# Patient Record
Sex: MALE | Race: WHITE | NOT HISPANIC OR LATINO | Employment: OTHER | URBAN - METROPOLITAN AREA
[De-identification: names, ages, dates, MRNs, and addresses within clinical notes are randomized per-mention and may not be internally consistent; named-entity substitution may affect disease eponyms.]

---

## 2021-06-23 ENCOUNTER — TELEPHONE (OUTPATIENT)
Dept: ONCOLOGY | Facility: CLINIC | Age: 61
End: 2021-06-23

## 2021-06-23 NOTE — TELEPHONE ENCOUNTER
Caller: Varsha Harris    Relationship: Self    Best call back number: [phone]     What was the call regarding: VARSHA IS A PATIENT OF DR. MAHER'S FROM BEFORE HE JOINED OUR PRACTICE. HE SAYS HE NEEDS SOME PAPERWORK FILLED OUT FOR HIS LONG TERM DISABILITY. HE SAYS DR. MAHER FILLED IT OUT THE LAST TIME. THEY WILL BE FAXING A FORM OVER [phone] TO BE FILLED OUT.    Do you require a callback: IF NEEDED

## 2021-06-25 NOTE — TELEPHONE ENCOUNTER
Case Management/ Note    Patient Name: Kit Harris  YOB: 1960  MRN #: [id number]    OSW spoke with Kit regarding his LT disability insurance as no fax has been received as of this date. He said he spoke with a representative from [facility] who said they have all they need. He is going to check with them again to make sure of this.     He is asking if his medical records from [facility] have been sent and was told that it did not look like anything has been received. He was assured that we have staff to take care of this, and that  our clinical manager would be apprised of his request.     Electronically signed by:   Shaylee Rader LCSW, OSW-C  06/25/21, 10:05 EDT

## 2021-08-09 ENCOUNTER — APPOINTMENT (OUTPATIENT)
Dept: LAB | Facility: HOSPITAL | Age: 61
End: 2021-08-09

## 2021-08-09 DIAGNOSIS — C20 RECTAL CANCER (HCC): Primary | ICD-10-CM

## 2021-08-23 ENCOUNTER — TELEPHONE (OUTPATIENT)
Dept: ONCOLOGY | Facility: HOSPITAL | Age: 61
End: 2021-08-23

## 2021-08-23 NOTE — TELEPHONE ENCOUNTER
Received call from [facility] needing lab orders for patient.  Faxed orders to [phone] per request

## 2021-08-25 NOTE — PROGRESS NOTES
HEMATOLOGY ONCOLOGY OUTPATIENT CONSULTATION       Patient name: Kit Harris  : 1960  MRN: [id number]  Primary Care Physician: Eliu Richards  Referring Physician: Eliu Richards  Reason For Consult:     Chief Complaint   Patient presents with   • Appointment         HPI:   History of Present Illness:  Kit Harris is 61 y.o. male who presented to our office on 21 for consultation regarding  Rectal adenocarcinoma status post multimodality treatment.    Patient had a heart attack in 2018. Patient had a stent placed and was started on dual antiplatelt therapy with aspirin and Brillinta. Patient has had minimal blood in stools prior to this. Patient was known to have fissure in the past and he always thought that was the cause of his blood in stools. When the patient started on anticoagulant, patient had worsening of his bleeding. He was then referred to see Dr. Bateman and had an EGD/colonsocopy    2019 - EGD/colon- moderate inactive gastritis. Reflux changes. Adenocarcinoma of the rectum. Large polyp right colon.  Biopsy confirmed adenocarcinoma of the rectum. Polypectomy revealed tubular adenoma.  2019 EUS revealed a large partially obstructing mass in the rectum. With clear invasion of muscularis propria. At least one 4 x 6 mm lymph node was appreciated. Presumptive diagnosis of T3 N1 rectal cancer.  MRI confirms diagnosis , PET with possible liver lesion , negative on MRI  Started on neoadjuvant treatment with XELODA and radiation    19 - C1D1 XelodaRT  10/3/19 - completed radiation treatment.  10/30/19 - Repeat MRI with good response based on imaging, no focal rectal mass lesion seen, decreased size of lymph node  19- Low anterior resection by Dr. Rich in [place]. This was complicated by Abscess, WILL post surgery.   Final path with ypT2N0 staging 0/20 LN positive  20 - C1D1 XELOX  20 C2D1  20 - C3D1  With intolerance,  treatment changed to single agent xeloda and eventually stopped   7/2020 - CT imaging with mildly increased adenopathy in the abdomen  11/2020 - CT imaging with improvement in presacral area, no evidence of disease.  5/2021 - CT CAP interval development of wedge compression fracture. No lytic lesions. No evidence of metastatic disease.  6/2021 -patient was admitted to [facility] for reversal of ostomy.  He had complications after the procedure with anastomotic leak requiring emergency surgery and repeat ileostomy.  He had aspiration pneumonia was on the ventilator and eventually recovered.  He is discharged has a wound VAC in place.  July 11, 2021 -CT chest abdomen pelvis with contrast showing no evidence of recurrence of disease.  August 2021 -CEA 2.6.      Subjective:  • 08/06/21.  Patient seen in clinic.  He has wound VAC in place.  He states that his wound is healing well.  He continues to see wound care for the same.  Denies any recent weight loss or any other concerning symptoms.    The following portions of the patient's history were reviewed and updated as appropriate: allergies, current medications, past family history, past medical history, past social history, past surgical history and problem list.    Past Medical History:   Diagnosis Date   • Back pain    • Constipation    • COPD (chronic obstructive pulmonary disease) (CMS/HCC)    • History of heart attack    • HTN (hypertension)    • Hyperlipidemia    • Ileostomy, has currently (CMS/HCC)    • Low serum cortisol level (CMS/HCC)    • ROCIO (obstructive sleep apnea)    • Rectal cancer (CMS/HCC)        Past Surgical History:   Procedure Laterality Date   • BACK SURGERY     • CHOLECYSTECTOMY     • CORONARY ANGIOPLASTY     • HERNIA REPAIR     • ILEOSTOMY           Current Outpatient Medications:   •  Fluticasone-Umeclidin-Vilant (TRELEGY) 200-62.5-25 MCG/INH inhaler, Inhale 1 puff., Disp: , Rfl:   •  Acetaminophen 325 MG capsule, , Disp: , Rfl:   •   albuterol sulfate  (90 Base) MCG/ACT inhaler, Inhale 2 puffs 4 (Four) Times a Day As Needed., Disp: , Rfl:   •  aspirin (aspirin) 81 MG EC tablet, Take 81 mg by mouth., Disp: , Rfl:   •  atorvastatin (LIPITOR) 40 MG tablet, Take 40 mg by mouth Daily., Disp: , Rfl:   •  Calcium Carb-Cholecalciferol (Calcium 1000 + D) 1000-800 MG-UNIT tablet, Take 1,200 mg by mouth Daily., Disp: , Rfl:   •  calcium carbonate-vitamin d 600-400 MG-UNIT per tablet, Take  by mouth., Disp: , Rfl:   •  diclofenac (VOLTAREN) 50 MG EC tablet, Take 50 mg by mouth 2 (Two) Times a Day., Disp: , Rfl:   •  famotidine (PEPCID) 40 MG tablet, Take 40 mg by mouth every night at bedtime., Disp: , Rfl:   •  gabapentin (NEURONTIN) 600 MG tablet, , Disp: , Rfl:   •  oxyCODONE (ROXICODONE) 10 MG tablet, TAKE 1 TABLET BY MOUTH EVERY 4 6 HOURS AS NEEDED FOR PAIN. AVERAGE 6 TABS/24H, Disp: , Rfl:   •  predniSONE (DELTASONE) 5 MG tablet, Take 5 mg by mouth Daily., Disp: , Rfl:   •  Trelegy Ellipta 200-62.5-25 MCG/INH inhaler, INHALE 1 PUFF 1 (ONE) TIME EACH DAY., Disp: , Rfl:     Allergies   Allergen Reactions   • Adhesive Tape Unknown - Low Severity     Other reaction(s): Multiple skin tears       Family History   Problem Relation Age of Onset   • Leukemia Mother    • Heart disease Father    • Lung disease Father    • Vaginal cancer Sister    • Diabetes Brother    • Hypertension Brother        Cancer-related family history includes Vaginal cancer in his sister.    Social History     Tobacco Use   • Smoking status: Former Smoker   • Tobacco comment: Quit 06/2021   Substance Use Topics   • Alcohol use: Not Currently   • Drug use: Never     Social History     Social History Narrative   • Not on file        ROS:     Review of Systems   Constitutional: Positive for fatigue. Negative for fever.   HENT: Negative for congestion and nosebleeds.    Eyes: Negative for pain.   Respiratory: Negative for cough and shortness of breath.    Cardiovascular: Negative for  "chest pain.   Gastrointestinal: Negative for abdominal pain, blood in stool, diarrhea, nausea and vomiting.   Endocrine: Negative for cold intolerance and heat intolerance.   Genitourinary: Negative for difficulty urinating.   Musculoskeletal: Negative for arthralgias.   Skin: Negative for rash.   Neurological: Negative for dizziness and headaches.   Hematological: Does not bruise/bleed easily.   Psychiatric/Behavioral: Negative for behavioral problems.       Objective:    Vitals:    08/26/21 0940   BP: 115/97   Pulse: 97   Resp: 18   Temp: 97.8 °F (36.6 °C)   Weight: 87.2 kg (192 lb 3.2 oz)   Height: 177.8 cm (70\")   PainSc: 0-No pain     Body mass index is 27.58 kg/m².  ECOG  (1) Restricted in physically strenuous activity, ambulatory and able to do work of light nature    Physical Exam:     Physical Exam  Constitutional:       Appearance: Normal appearance.   HENT:      Head: Normocephalic and atraumatic.   Eyes:      Pupils: Pupils are equal, round, and reactive to light.   Cardiovascular:      Rate and Rhythm: Normal rate and regular rhythm.      Pulses: Normal pulses.      Heart sounds: No murmur heard.     Pulmonary:      Effort: Pulmonary effort is normal.      Breath sounds: Normal breath sounds.   Abdominal:      General: There is no distension.      Palpations: Abdomen is soft. There is no mass.      Tenderness: There is no abdominal tenderness.   Musculoskeletal:         General: Normal range of motion.      Cervical back: Normal range of motion.   Skin:     General: Skin is warm.   Neurological:      General: No focal deficit present.      Mental Status: He is alert.   Psychiatric:         Mood and Affect: Mood normal.         Lab Results - Last 18 Months   Lab Units 07/16/21  0351 07/14/21  0251 07/11/21  0401   WBC 10*3/uL 12.73* 19.77* 22.97*   HEMOGLOBIN g/dL 9.7* 10.0* 9.0*   HEMATOCRIT % 30.8* 31.0* 27.2*   PLATELETS 10*3/uL 489* 467* 388   MCV fL 96.3 94.8 93.8     Lab Results - Last 18 Months "   Lab Units 07/09/21  0605 07/08/21  1710 07/08/21  0442   POTASSIUM mmol/L 3.4* 2.3*  --    BILIRUBIN mg/dL  --   --  0.4   ALK PHOS U/L  --   --  190*   ALT (SGPT) U/L  --   --  79*   AST (SGOT) U/L  --   --  71*       Lab Results   Component Value Date    BUN 10 12/05/2019    CREATININE 1.2 12/05/2019    BCR 8.3 12/05/2019    K 3.4 (L) 07/09/2021    CO2 27 12/05/2019    CALCIUM 8.4 12/05/2019    ALBUMIN 2.7 (L) 07/08/2021    LABIL2 0.9 (L) 07/08/2021    AST 71 (H) 07/08/2021    ALT 79 (H) 07/08/2021       Lab Results - Last 18 Months   Lab Units 07/03/21  1841   INR INR 1.4   APTT s 34.5       No results found for: IRON, TIBC, FERRITIN    No results found for: FOLATE    No results found for: OCCULTBLD    No results found for: RETICCTPCT    No results found for: CDOMRXTH46  No results found for: SPEP, UPEP  Uric Acid   Date Value Ref Range Status   11/25/2019 7.1 2.5 - 8.5 mg/dL Final     No results found for: TAN, RF, SEDRATE  Lab Results   Component Value Date    FIBRINOGEN 576 (H) 07/03/2021     Lab Results   Component Value Date    PTT 34.5 07/03/2021    INR 1.4 07/03/2021     No results found for:   No results found for: CEA  No components found for: CA-19-9  No results found for: PSA  No results found for: AFPTM, FC2800, HCGTM, SPEP, LARISSA         Assessment/Plan     Patient is a 61-year-old male with T3 N1 M0 rectal adenocarcinoma. He has completed treatment with neoadjuvant chemoradiation with Xeloda s/p LAR and now on adjuvant chemotherapy with Xeloda.    Rectal adenocarcinoma  T3N1 disease preoperatively  downstaged to ypT2N0 post surgery with no lymph nodes positive  I discussed adjuvant chemotherapy for a total of 4 months with XELOX based on NCCN guidelines, significant benefit even with downstaged after neoadjuvant chemoradiation. Benefit is not very significant in patients with CR however patient had a NM and would still have benefit.   Patient was agreeable and was started on XELOX   C2 with  worsening neuropathty affecting quality of life, hand foot disease with scaling, erythema of the hands  Dose reduction of Oxaliplatin to 75 mg/m2 and Xeloda to 1500 mg BID  C3 with worsening neuropathy, Oxaliplatin was stopped and continued single agent Xeloda. Patient has had significant toxicity with xeloda, most concerning is neurotoxicity with confusion which has improved significantly after stopping Xeloda.  With increasing toxicity, treatment was witheld. DPD enzyme assay was normal.  Patient was seen by his surgeon for ileostomy reversal.contineus to have a presacral collection and had a drain tube placed with recent imaging showing improvement in the area.    Ileostomy reversal complicated with postop anastomotic leak, aspiration pneumonia.  He has a new ileostomy.  Continue see wound care wound has been improving.  July 2021 imaging with no evidence of disease recent CEA 2.6 and normal.  Follow up in 3 months with cbc cmp cea, imaging.    Central Hypothyroidism  Patient is on low dose prednisone 5 mg.    Leg weakness/Balance problems  Neuropathy is likely contributing to the same  gabapentin 600 mg QID   that has improved now.    Lower back pain  - patient has seen a pain clinic, continues to be on oxycodone 10 mg as needed, also has had steroid injection , pain control better. Has had compression fracture,  bisphosphonates with Endocrine continues to be on calcium vitamin D.. Follow up with pain clinic and orthopedics.    COPD   echocardiogram with normal findings  likely from COPD  ICS stopped, continues to be on Stiolto   Follows Dr. Sheffield at [facility].    Anemia  S/p 2 doses of iv venofer  Hb slightly low now which is postop.  Continues to take oral iron.  I would repeat this on follow-up.    Follow-up in 3 months for surveillance         Thank you very much for providing the opportunity to participate in this patient’s care. Please do not hesitate to call if there are any other questions    I have reviewed  and confirmed the accuracy of the patient's history: Chief complaint, HPI, ROS, Subjective and Past Family Social History as entered by the MA/LPN/RN.     Patricia Nguyễn MD 08/26/21

## 2021-08-26 ENCOUNTER — OFFICE VISIT (OUTPATIENT)
Dept: ONCOLOGY | Facility: CLINIC | Age: 61
End: 2021-08-26

## 2021-08-26 VITALS
BODY MASS INDEX: 27.52 KG/M2 | TEMPERATURE: 97.8 F | SYSTOLIC BLOOD PRESSURE: 115 MMHG | WEIGHT: 192.2 LBS | RESPIRATION RATE: 18 BRPM | HEART RATE: 97 BPM | DIASTOLIC BLOOD PRESSURE: 97 MMHG | HEIGHT: 70 IN

## 2021-08-26 DIAGNOSIS — C20 RECTAL CANCER (HCC): Primary | ICD-10-CM

## 2021-08-26 PROCEDURE — 99204 OFFICE O/P NEW MOD 45 MIN: CPT | Performed by: INTERNAL MEDICINE

## 2021-08-26 RX ORDER — ATORVASTATIN CALCIUM 40 MG/1
40 TABLET, FILM COATED ORAL DAILY
COMMUNITY
Start: 2021-07-12 | End: 2022-12-22 | Stop reason: DRUGHIGH

## 2021-08-26 RX ORDER — PREDNISONE 1 MG/1
5 TABLET ORAL DAILY
COMMUNITY
Start: 2021-07-27

## 2021-08-26 RX ORDER — OXYCODONE HYDROCHLORIDE 10 MG/1
15 TABLET ORAL
COMMUNITY
Start: 2021-07-10

## 2021-08-26 RX ORDER — FAMOTIDINE 40 MG/1
40 TABLET, FILM COATED ORAL
COMMUNITY
Start: 2021-07-26

## 2021-08-26 RX ORDER — ALBUTEROL SULFATE 90 UG/1
2 AEROSOL, METERED RESPIRATORY (INHALATION) 4 TIMES DAILY PRN
COMMUNITY
Start: 2021-07-26

## 2021-08-26 RX ORDER — GABAPENTIN 600 MG/1
TABLET ORAL
COMMUNITY
Start: 2021-08-05

## 2021-08-26 RX ORDER — ASPIRIN 81 MG/1
81 TABLET ORAL
COMMUNITY

## 2021-11-11 ENCOUNTER — TELEPHONE (OUTPATIENT)
Dept: ONCOLOGY | Facility: CLINIC | Age: 61
End: 2021-11-11

## 2021-11-11 NOTE — TELEPHONE ENCOUNTER
Hub is instructed to read the documentation below to patient  ATTEMPTED TO CONTACT PATIENT REGARDING 12-02-21 MD F/U.    NO ANSWER.  LMV INFORMING HIM DR. MAHER WAS OUT OF THE OFFICE THAT DAY AND HE WOULD NEED TO RESCHEDULE.

## 2021-11-29 ENCOUNTER — LAB (OUTPATIENT)
Dept: LAB | Facility: HOSPITAL | Age: 61
End: 2021-11-29

## 2021-11-29 ENCOUNTER — HOSPITAL ENCOUNTER (OUTPATIENT)
Dept: ONCOLOGY | Facility: HOSPITAL | Age: 61
Setting detail: INFUSION SERIES
Discharge: HOME | End: 2021-11-29

## 2021-11-29 ENCOUNTER — HOSPITAL ENCOUNTER (OUTPATIENT)
Dept: PET IMAGING | Facility: HOSPITAL | Age: 61
Discharge: HOME | End: 2021-11-29
Admitting: INTERNAL MEDICINE

## 2021-11-29 DIAGNOSIS — C20 RECTAL CANCER (HCC): ICD-10-CM

## 2021-11-29 DIAGNOSIS — C20 RECTAL CANCER (HCC): Primary | ICD-10-CM

## 2021-11-29 LAB
ALBUMIN SERPL-MCNC: 4.1 G/DL (ref 3.5–5.2)
ALBUMIN/GLOB SERPL: 1.4 G/DL
ALP SERPL-CCNC: 119 U/L (ref 39–117)
ALT SERPL W P-5'-P-CCNC: 40 U/L (ref 1–41)
ANION GAP SERPL CALCULATED.3IONS-SCNC: 13 MMOL/L (ref 5–15)
AST SERPL-CCNC: 31 U/L (ref 1–40)
BASOPHILS # BLD AUTO: 0.02 10*3/MM3 (ref 0–0.2)
BASOPHILS NFR BLD AUTO: 0.3 % (ref 0–1.5)
BILIRUB SERPL-MCNC: 0.3 MG/DL (ref 0–1.2)
BUN SERPL-MCNC: 24 MG/DL (ref 8–23)
BUN/CREAT SERPL: 22.2 (ref 7–25)
CALCIUM SPEC-SCNC: 9.3 MG/DL (ref 8.6–10.5)
CEA SERPL-MCNC: 1.96 NG/ML
CHLORIDE SERPL-SCNC: 95 MMOL/L (ref 98–107)
CO2 SERPL-SCNC: 26 MMOL/L (ref 22–29)
CREAT BLDA-MCNC: 1.2 MG/DL (ref 0.6–1.3)
CREAT SERPL-MCNC: 1.08 MG/DL (ref 0.76–1.27)
DEPRECATED RDW RBC AUTO: 50.3 FL (ref 37–54)
EOSINOPHIL # BLD AUTO: 0.12 10*3/MM3 (ref 0–0.4)
EOSINOPHIL NFR BLD AUTO: 1.5 % (ref 0.3–6.2)
ERYTHROCYTE [DISTWIDTH] IN BLOOD BY AUTOMATED COUNT: 14.9 % (ref 12.3–15.4)
GFR SERPL CREATININE-BSD FRML MDRD: 70 ML/MIN/1.73
GLOBULIN UR ELPH-MCNC: 2.9 GM/DL
GLUCOSE SERPL-MCNC: 88 MG/DL (ref 65–99)
HCT VFR BLD AUTO: 36.2 % (ref 37.5–51)
HGB BLD-MCNC: 11.6 G/DL (ref 13–17.7)
LYMPHOCYTES # BLD AUTO: 0.94 10*3/MM3 (ref 0.7–3.1)
LYMPHOCYTES NFR BLD AUTO: 12 % (ref 19.6–45.3)
MCH RBC QN AUTO: 30.2 PG (ref 26.6–33)
MCHC RBC AUTO-ENTMCNC: 32 G/DL (ref 31.5–35.7)
MCV RBC AUTO: 94.3 FL (ref 79–97)
MONOCYTES # BLD AUTO: 0.66 10*3/MM3 (ref 0.1–0.9)
MONOCYTES NFR BLD AUTO: 8.4 % (ref 5–12)
NEUTROPHILS NFR BLD AUTO: 6.08 10*3/MM3 (ref 1.7–7)
NEUTROPHILS NFR BLD AUTO: 77.8 % (ref 42.7–76)
PLATELET # BLD AUTO: 270 10*3/MM3 (ref 140–450)
PMV BLD AUTO: 9 FL (ref 6–12)
POTASSIUM SERPL-SCNC: 4.6 MMOL/L (ref 3.5–5.2)
PROT SERPL-MCNC: 7 G/DL (ref 6–8.5)
RBC # BLD AUTO: 3.84 10*6/MM3 (ref 4.14–5.8)
SODIUM SERPL-SCNC: 134 MMOL/L (ref 136–145)
WBC NRBC COR # BLD: 7.82 10*3/MM3 (ref 3.4–10.8)

## 2021-11-29 PROCEDURE — 85025 COMPLETE CBC W/AUTO DIFF WBC: CPT

## 2021-11-29 PROCEDURE — 82565 ASSAY OF CREATININE: CPT

## 2021-11-29 PROCEDURE — 71260 CT THORAX DX C+: CPT

## 2021-11-29 PROCEDURE — 0 IOPAMIDOL PER 1 ML: Performed by: INTERNAL MEDICINE

## 2021-11-29 PROCEDURE — 82378 CARCINOEMBRYONIC ANTIGEN: CPT

## 2021-11-29 PROCEDURE — 80053 COMPREHEN METABOLIC PANEL: CPT

## 2021-11-29 PROCEDURE — 74177 CT ABD & PELVIS W/CONTRAST: CPT

## 2021-11-29 RX ADMIN — IOPAMIDOL 100 ML: 755 INJECTION, SOLUTION INTRAVENOUS at 10:40

## 2021-11-29 NOTE — PROGRESS NOTES
Patient came from [facility], dept with #22 angiocath saline locked intact to right AC space and unable to obtain enough blood from this angicath to do labs as ordered per MD. Angiocath flushed and removed intact. Applied sterile gauze and band-aid [facility] lab staff wendy labs as ordered per MD.

## 2021-12-02 ENCOUNTER — APPOINTMENT (OUTPATIENT)
Dept: ONCOLOGY | Facility: CLINIC | Age: 61
End: 2021-12-02

## 2021-12-06 ENCOUNTER — TELEPHONE (OUTPATIENT)
Dept: ONCOLOGY | Facility: CLINIC | Age: 61
End: 2021-12-06

## 2021-12-06 NOTE — TELEPHONE ENCOUNTER
Caller: MASSIEL BOLTON    Relationship: Emergency Contact    Best call back number: [phone]    What is the best time to reach you: ANYTIME    Who are you requesting to speak with (clinical staff, provider,  specific staff member): CLINICAL      What was the call regarding: PATIENTS SPOUSE CALLED WANTED TO GET CT SCAN RESULTS    Do you require a callback: YES

## 2021-12-14 NOTE — PROGRESS NOTES
HEMATOLOGY ONCOLOGY OUTPATIENT FOLLOW UP      Patient name: Kit Harris  : 1960  MRN: [id number]  Primary Care Physician: Eliu Richards  Referring Physician: Eliu Richards  Reason For Consult:     Chief Complaint   Patient presents with   • Follow-up     Rectal cancer         HPI:   History of Present Illness:  Kit Harris is 61 y.o. male who presented to our office on 21 for consultation regarding  Rectal adenocarcinoma status post multimodality treatment.    Patient had a heart attack in 2018. Patient had a stent placed and was started on dual antiplatelt therapy with aspirin and Brillinta. Patient has had minimal blood in stools prior to this. Patient was known to have fissure in the past and he always thought that was the cause of his blood in stools. When the patient started on anticoagulant, patient had worsening of his bleeding. He was then referred to see Dr. Bateman and had an EGD/colonsocopy    2019 - EGD/colon- moderate inactive gastritis. Reflux changes. Adenocarcinoma of the rectum. Large polyp right colon.  Biopsy confirmed adenocarcinoma of the rectum. Polypectomy revealed tubular adenoma.  2019 EUS revealed a large partially obstructing mass in the rectum. With clear invasion of muscularis propria. At least one 4 x 6 mm lymph node was appreciated. Presumptive diagnosis of T3 N1 rectal cancer.  MRI confirms diagnosis , PET with possible liver lesion , negative on MRI  Started on neoadjuvant treatment with XELODA and radiation    19 - C1D1 XelodaRT  10/3/19 - completed radiation treatment.  10/30/19 - Repeat MRI with good response based on imaging, no focal rectal mass lesion seen, decreased size of lymph node  19- Low anterior resection by Dr. Rich in [place]. This was complicated by Abscess, WILL post surgery.   Final path with ypT2N0 staging 0/20 LN positive  20 - C1D1 XELOX  20 C2D1  20 - C3D1  With  intolerance, treatment changed to single agent xeloda and eventually stopped   7/2020 - CT imaging with mildly increased adenopathy in the abdomen  11/2020 - CT imaging with improvement in presacral area, no evidence of disease.  5/2021 - CT CAP interval development of wedge compression fracture. No lytic lesions. No evidence of metastatic disease.  6/2021 -patient was admitted to [facility] for reversal of ostomy.  He had complications after the procedure with anastomotic leak requiring emergency surgery and repeat ileostomy.  He had aspiration pneumonia was on the ventilator and eventually recovered.  He is discharged has a wound VAC in place.  July 11, 2021 -CT chest abdomen pelvis with contrast showing no evidence of recurrence of disease.  August 2021 -CEA 2.6.  November 2021 -CT chest abdomen pelvis with stable postoperative changes, stable compression fractures, stable fracture of the sacrum.  No evidence of new or recurrent disease.  December 2021 -CEA 1.9.    Subjective:  Patient is seen for follow-up.  Overall doing well has some intermittent rectal bleeding.  Fatigue is improved since working with physical therapy    The following portions of the patient's history were reviewed and updated as appropriate: allergies, current medications, past family history, past medical history, past social history, past surgical history and problem list.    Past Medical History:   Diagnosis Date   • Back pain    • Constipation    • COPD (chronic obstructive pulmonary disease) (HCC)    • History of heart attack    • HTN (hypertension)    • Hyperlipidemia    • Ileostomy, has currently (HCC)    • Low serum cortisol level (HCC)    • ROCIO (obstructive sleep apnea)    • Rectal cancer (HCC)        Past Surgical History:   Procedure Laterality Date   • BACK SURGERY     • CHOLECYSTECTOMY     • CORONARY ANGIOPLASTY     • HERNIA REPAIR     • ILEOSTOMY           Current Outpatient Medications:   •  Acetaminophen 325 MG capsule,  , Disp: , Rfl:   •  albuterol sulfate  (90 Base) MCG/ACT inhaler, Inhale 2 puffs 4 (Four) Times a Day As Needed., Disp: , Rfl:   •  aspirin (aspirin) 81 MG EC tablet, Take 81 mg by mouth., Disp: , Rfl:   •  atorvastatin (LIPITOR) 40 MG tablet, Take 40 mg by mouth Daily., Disp: , Rfl:   •  Calcium Carb-Cholecalciferol (Calcium 1000 + D) 1000-800 MG-UNIT tablet, Take 1,200 mg by mouth Daily., Disp: , Rfl:   •  calcium carbonate-vitamin d 600-400 MG-UNIT per tablet, Take  by mouth., Disp: , Rfl:   •  diclofenac (VOLTAREN) 50 MG EC tablet, Take 50 mg by mouth 2 (Two) Times a Day., Disp: , Rfl:   •  famotidine (PEPCID) 40 MG tablet, Take 40 mg by mouth every night at bedtime., Disp: , Rfl:   •  Fluticasone-Umeclidin-Vilant (TRELEGY) 200-62.5-25 MCG/INH inhaler, Inhale 1 puff., Disp: , Rfl:   •  gabapentin (NEURONTIN) 600 MG tablet, , Disp: , Rfl:   •  oxyCODONE (ROXICODONE) 10 MG tablet, , Disp: , Rfl:   •  predniSONE (DELTASONE) 5 MG tablet, Take 5 mg by mouth Daily., Disp: , Rfl:   •  Trelegy Ellipta 200-62.5-25 MCG/INH inhaler, INHALE 1 PUFF 1 (ONE) TIME EACH DAY., Disp: , Rfl:     Allergies   Allergen Reactions   • Adhesive Tape Unknown - Low Severity     Other reaction(s): Multiple skin tears       Family History   Problem Relation Age of Onset   • Leukemia Mother    • Heart disease Father    • Lung disease Father    • Vaginal cancer Sister    • Diabetes Brother    • Hypertension Brother        Cancer-related family history includes Vaginal cancer in his sister.    Social History     Tobacco Use   • Smoking status: Former Smoker   • Smokeless tobacco: Not on file   • Tobacco comment: Quit 06/2021   Substance Use Topics   • Alcohol use: Not Currently   • Drug use: Never     Social History     Social History Narrative   • Not on file        ROS:     Review of Systems   Constitutional: Positive for fatigue. Negative for fever.   HENT: Negative for congestion and nosebleeds.    Eyes: Negative for pain.  "  Respiratory: Negative for cough and shortness of breath.    Cardiovascular: Negative for chest pain.   Gastrointestinal: Positive for anal bleeding. Negative for abdominal pain, blood in stool, diarrhea, nausea and vomiting.   Endocrine: Negative for cold intolerance and heat intolerance.   Genitourinary: Negative for difficulty urinating.   Musculoskeletal: Negative for arthralgias.   Skin: Negative for rash.   Neurological: Negative for dizziness and headaches.   Hematological: Does not bruise/bleed easily.   Psychiatric/Behavioral: Negative for behavioral problems.       Objective:    Vitals:    12/16/21 0954   BP: 92/64   Pulse: 92   Resp: 18   Temp: 97.8 °F (36.6 °C)   SpO2: 95%   Weight: 90.4 kg (199 lb 6.4 oz)   Height: 177.8 cm (70\")   PainSc:   6   PainLoc: Back     Body mass index is 28.61 kg/m².  ECOG  (1) Restricted in physically strenuous activity, ambulatory and able to do work of light nature    Physical Exam:     Physical Exam  Constitutional:       Appearance: Normal appearance.   HENT:      Head: Normocephalic and atraumatic.   Eyes:      Pupils: Pupils are equal, round, and reactive to light.   Cardiovascular:      Rate and Rhythm: Normal rate and regular rhythm.      Pulses: Normal pulses.      Heart sounds: No murmur heard.      Pulmonary:      Effort: Pulmonary effort is normal.      Breath sounds: Normal breath sounds.   Abdominal:      General: There is no distension.      Palpations: Abdomen is soft. There is no mass.      Tenderness: There is no abdominal tenderness.   Musculoskeletal:         General: Normal range of motion.      Cervical back: Normal range of motion.   Skin:     General: Skin is warm.   Neurological:      General: No focal deficit present.      Mental Status: He is alert.   Psychiatric:         Mood and Affect: Mood normal.         Lab Results - Last 18 Months   Lab Units 11/29/21  1115 07/16/21  0351 07/14/21  0251   WBC 10*3/mm3 7.82 12.73* 19.77*   HEMOGLOBIN g/dL " 11.6* 9.7* 10.0*   HEMATOCRIT % 36.2* 30.8* 31.0*   PLATELETS 10*3/mm3 270 489* 467*   MCV fL 94.3 96.3 94.8     Lab Results - Last 18 Months   Lab Units 11/29/21  1115 11/29/21  1018 07/09/21  0605 07/08/21  1710 07/08/21  0442   SODIUM mmol/L 134*  --   --   --   --    POTASSIUM mmol/L 4.6  --  3.4* 2.3*  --    CHLORIDE mmol/L 95*  --   --   --   --    CO2 mmol/L 26.0  --   --   --   --    BUN mg/dL 24*  --   --   --   --    CREATININE mg/dL 1.08 1.20  --   --   --    CALCIUM mg/dL 9.3  --   --   --   --    BILIRUBIN mg/dL 0.3  --   --   --  0.4   ALK PHOS U/L 119*  --   --   --  190*   ALT (SGPT) U/L 40  --   --   --  79*   AST (SGOT) U/L 31  --   --   --  71*   GLUCOSE mg/dL 88  --   --   --   --        Lab Results   Component Value Date    GLUCOSE 88 11/29/2021    BUN 24 (H) 11/29/2021    CREATININE 1.08 11/29/2021    EGFRIFNONA 70 11/29/2021    BCR 22.2 11/29/2021    K 4.6 11/29/2021    CO2 26.0 11/29/2021    CALCIUM 9.3 11/29/2021    ALBUMIN 4.10 11/29/2021    LABIL2 0.9 (L) 07/08/2021    AST 31 11/29/2021    ALT 40 11/29/2021       Lab Results - Last 18 Months   Lab Units 07/03/21  1841   INR INR 1.4   APTT s 34.5       No results found for: IRON, TIBC, FERRITIN    No results found for: FOLATE    No results found for: OCCULTBLD    No results found for: RETICCTPCT    No results found for: UJEINAKT34  No results found for: SPEP, UPEP  Uric Acid   Date Value Ref Range Status   11/25/2019 7.1 2.5 - 8.5 mg/dL Final     No results found for: TAN, RF, SEDRATE  Lab Results   Component Value Date    FIBRINOGEN 576 (H) 07/03/2021     Lab Results   Component Value Date    PTT 34.5 07/03/2021    INR 1.4 07/03/2021     No results found for:   Lab Results   Component Value Date    CEA 1.96 11/29/2021     No components found for: CA-19-9  No results found for: PSA  No results found for: AFPTM, TZ4222, HCGTM, SPEP, LARISSA         Assessment/Plan     Patient is a 61-year-old male with T3 N1 M0 rectal adenocarcinoma. He has  completed treatment with neoadjuvant chemoradiation with Xeloda s/p LAR and finished adjuvant treatment with XELOX    Rectal adenocarcinoma  T3N1 disease preoperatively  downstaged to ypT2N0 post surgery with no lymph nodes positive  I discussed adjuvant chemotherapy for a total of 4 months with XELOX based on NCCN guidelines, significant benefit even with downstaged after neoadjuvant chemoradiation. Benefit is not very significant in patients with CR however patient had a AK and would still have benefit.   Patient was agreeable and was started on XELOX   C2 with worsening neuropathty affecting quality of life, hand foot disease with scaling, erythema of the hands  Dose reduction of Oxaliplatin to 75 mg/m2 and Xeloda to 1500 mg BID  C3 with worsening neuropathy, Oxaliplatin was stopped and continued single agent Xeloda. Patient has had significant toxicity with xeloda, most concerning is neurotoxicity with confusion which has improved significantly after stopping Xeloda.  With increasing toxicity, treatment was witheld. DPD enzyme assay was normal.  Patient was seen by his surgeon for ileostomy reversal.contineus to have a presacral collection and had a drain tube placed with recent imaging showing improvement in the area.  Ileostomy reversal complicated with postop anastomotic leak, aspiration pneumonia.  He has a new ileostomy.  Continue see wound care wound has been improving.  July 2021 imaging with no evidence of disease recent CEA 2.6 and normal.  November imaging with no evidence of disease recurrence.  CEA 1.9.  At this point he is 2 years out  We will switch to 6 months follow-up with CMP CBC CEA and imaging.    Central Hypothyroidism  Patient is on low dose prednisone 5 mg.    Leg weakness/Balance problems  Neuropathy is likely contributing to the same  gabapentin 600 mg QID  that has improved now.    Lower back pain  patient has seen a pain clinic, continues to be on oxycodone 10 mg as needed, also has  had steroid injection , pain control better. Has had compression fracture,  bisphosphonates with Endocrine continues to be on calcium vitamin D.   Patient wants referral to pain clinic locally.    COPD   echocardiogram with normal findings  likely from COPD  ICS stopped, continues to be on Stiolto   Follows Dr. Sheffield at [facility].    Anemia  S/p 2 doses of iv venofer in summer 2021  Now he has intermittent intermittent rectal bleeding and low hemoglobin  I will check iron studies today.  Increase oral iron to once a day from every other day.  Repeat CBC and iron studies in 3 months.      Follow-up in 6 months for surveillance         Thank you very much for providing the opportunity to participate in this patient’s care. Please do not hesitate to call if there are any other questions    I have reviewed and confirmed the accuracy of the patient's history: Chief complaint, HPI, ROS, Subjective and Past Family Social History as entered by the MA/LUISITON/RN.     Patricia Nguyễn MD 12/16/21

## 2021-12-16 ENCOUNTER — OFFICE VISIT (OUTPATIENT)
Dept: ONCOLOGY | Facility: CLINIC | Age: 61
End: 2021-12-16

## 2021-12-16 ENCOUNTER — CLINICAL SUPPORT (OUTPATIENT)
Dept: FAMILY MEDICINE CLINIC | Facility: CLINIC | Age: 61
End: 2021-12-16

## 2021-12-16 VITALS
BODY MASS INDEX: 28.55 KG/M2 | DIASTOLIC BLOOD PRESSURE: 64 MMHG | OXYGEN SATURATION: 95 % | WEIGHT: 199.4 LBS | HEIGHT: 70 IN | RESPIRATION RATE: 18 BRPM | TEMPERATURE: 97.8 F | HEART RATE: 92 BPM | SYSTOLIC BLOOD PRESSURE: 92 MMHG

## 2021-12-16 DIAGNOSIS — M47.27 OSTEOARTHRITIS OF SPINE WITH RADICULOPATHY, LUMBOSACRAL REGION: ICD-10-CM

## 2021-12-16 DIAGNOSIS — C20 RECTAL CANCER (HCC): ICD-10-CM

## 2021-12-16 DIAGNOSIS — M54.17 LUMBOSACRAL RADICULOPATHY: ICD-10-CM

## 2021-12-16 DIAGNOSIS — C20 RECTAL CANCER (HCC): Primary | ICD-10-CM

## 2021-12-16 LAB
ALBUMIN SERPL-MCNC: 4.1 G/DL (ref 3.5–5.2)
ALBUMIN/GLOB SERPL: 1.5 G/DL
ALP SERPL-CCNC: 120 U/L (ref 39–117)
ALT SERPL W P-5'-P-CCNC: 19 U/L (ref 1–41)
ANION GAP SERPL CALCULATED.3IONS-SCNC: 11 MMOL/L (ref 5–15)
AST SERPL-CCNC: 15 U/L (ref 1–40)
BASOPHILS # BLD AUTO: 0.1 10*3/MM3 (ref 0–0.2)
BASOPHILS NFR BLD AUTO: 0.9 % (ref 0–1.5)
BILIRUB SERPL-MCNC: 0.3 MG/DL (ref 0–1.2)
BUN SERPL-MCNC: 10 MG/DL (ref 8–23)
BUN/CREAT SERPL: 9.6 (ref 7–25)
CALCIUM SPEC-SCNC: 9.3 MG/DL (ref 8.6–10.5)
CEA SERPL-MCNC: 2.82 NG/ML
CHLORIDE SERPL-SCNC: 101 MMOL/L (ref 98–107)
CO2 SERPL-SCNC: 28 MMOL/L (ref 22–29)
CREAT SERPL-MCNC: 1.04 MG/DL (ref 0.76–1.27)
DEPRECATED RDW RBC AUTO: 51.6 FL (ref 37–54)
EOSINOPHIL # BLD AUTO: 0.3 10*3/MM3 (ref 0–0.4)
EOSINOPHIL NFR BLD AUTO: 4.8 % (ref 0.3–6.2)
ERYTHROCYTE [DISTWIDTH] IN BLOOD BY AUTOMATED COUNT: 16.2 % (ref 12.3–15.4)
FERRITIN SERPL-MCNC: 99.76 NG/ML (ref 30–400)
GFR SERPL CREATININE-BSD FRML MDRD: 73 ML/MIN/1.73
GLOBULIN UR ELPH-MCNC: 2.7 GM/DL
GLUCOSE SERPL-MCNC: 91 MG/DL (ref 65–99)
HCT VFR BLD AUTO: 34.4 % (ref 37.5–51)
HGB BLD-MCNC: 11.6 G/DL (ref 13–17.7)
IRON 24H UR-MRATE: 43 MCG/DL (ref 59–158)
IRON SATN MFR SERPL: 13 % (ref 20–50)
LYMPHOCYTES # BLD AUTO: 0.7 10*3/MM3 (ref 0.7–3.1)
LYMPHOCYTES NFR BLD AUTO: 9.4 % (ref 19.6–45.3)
MCH RBC QN AUTO: 30.6 PG (ref 26.6–33)
MCHC RBC AUTO-ENTMCNC: 33.8 G/DL (ref 31.5–35.7)
MCV RBC AUTO: 90.6 FL (ref 79–97)
MONOCYTES # BLD AUTO: 0.5 10*3/MM3 (ref 0.1–0.9)
MONOCYTES NFR BLD AUTO: 7.4 % (ref 5–12)
NEUTROPHILS NFR BLD AUTO: 5.6 10*3/MM3 (ref 1.7–7)
NEUTROPHILS NFR BLD AUTO: 77.5 % (ref 42.7–76)
NRBC BLD AUTO-RTO: 0 /100 WBC (ref 0–0.2)
PLATELET # BLD AUTO: 294 10*3/MM3 (ref 140–450)
PMV BLD AUTO: 7.9 FL (ref 6–12)
POTASSIUM SERPL-SCNC: 4 MMOL/L (ref 3.5–5.2)
PROT SERPL-MCNC: 6.8 G/DL (ref 6–8.5)
RBC # BLD AUTO: 3.8 10*6/MM3 (ref 4.14–5.8)
SODIUM SERPL-SCNC: 140 MMOL/L (ref 136–145)
TIBC SERPL-MCNC: 332 MCG/DL (ref 298–536)
TRANSFERRIN SERPL-MCNC: 223 MG/DL (ref 200–360)
WBC NRBC COR # BLD: 7.2 10*3/MM3 (ref 3.4–10.8)

## 2021-12-16 PROCEDURE — 85025 COMPLETE CBC W/AUTO DIFF WBC: CPT | Performed by: INTERNAL MEDICINE

## 2021-12-16 PROCEDURE — 82728 ASSAY OF FERRITIN: CPT | Performed by: INTERNAL MEDICINE

## 2021-12-16 PROCEDURE — 36415 COLL VENOUS BLD VENIPUNCTURE: CPT | Performed by: INTERNAL MEDICINE

## 2021-12-16 PROCEDURE — 84466 ASSAY OF TRANSFERRIN: CPT | Performed by: INTERNAL MEDICINE

## 2021-12-16 PROCEDURE — 80053 COMPREHEN METABOLIC PANEL: CPT | Performed by: INTERNAL MEDICINE

## 2021-12-16 PROCEDURE — 99214 OFFICE O/P EST MOD 30 MIN: CPT | Performed by: INTERNAL MEDICINE

## 2021-12-16 PROCEDURE — 83540 ASSAY OF IRON: CPT | Performed by: INTERNAL MEDICINE

## 2021-12-16 PROCEDURE — 82378 CARCINOEMBRYONIC ANTIGEN: CPT | Performed by: INTERNAL MEDICINE

## 2021-12-16 NOTE — PROGRESS NOTES
Venipuncture Blood Specimen Collection  Venipuncture performed in [facility] by David Us CMA with good hemostasis. Patient tolerated the procedure well without complications.   12/16/21   David Us CMA

## 2021-12-20 PROBLEM — C20 RECTAL CANCER: Status: ACTIVE | Noted: 2019-08-08

## 2022-01-24 NOTE — PROGRESS NOTES
CHIEF COMPLAINT  Lower back pain      Subjective   Kit Harris is a 61 y.o. male who was referred by Patricia Quiñonez MD  to our pain management clinic for consultation, evaluation and treatment of lower back pain. He has been seeing Dr.Gregory Sadler ([place], [place]) currently and changing care due to distance. He has seen spine surgeon who recommended kypho for his new compression fractures found in 12/2021. Last MRI was 12/2021 at [place], [place]. His back pain started in 1990s and has been getting worse since then. He had surgery in 1990 which made the pain better but has been getting worse in the last 10 to 15 years.    Lower back pain is 4/10 on VAS, at maximum is 5/10. Pain is stabbing, shooting, aching in nature. Pain is referred left posterior thigh, posterior calf, toes. Also, has b/l numbness and tingling in b/l foot. The pain is constant. The pain is improved by pain meds. The pain is worse with walking, stnading. States that his left leg gives away.    PHQ-9- 3  SOAPP- 2    PMH:   Rectal adenocarcinoma s/p multimodality treatment and ileostomy-2019, MI-8/20/2018 s/p stent and DAPT, constipation, hypertension, ROCIO, history of back surgery, neurotoxicity with Xeloda in past, ileostomy reversal complicated with postop anastomotic leak, history of compression fracture and on bisphosphonate, calcium and vitamin D, reclast with endocrine.  He has seen pain management before and had steroid injection with 2-3 weeks of relief.    Current Medications:   Roxicodone 10 mg q4H PRN (180 tabs/ month- 90 MME - 1/7/2022)  Mobic 7.5 mg BID PRN  Gabapentin 600 mg QID  Prednisone 5 mg  aspirin      Past Medications:    Past Modalities:  TENS:       no          Physical Therapy Within The Last 6 Months     Yes- 12/2022   Psychotherapy     no  Massage Therapy      no    Patient Complains Of:  Uro-Fecal Incontinence no  Weight Gain/Loss  no  Fever/Chills   no  Weakness   Yes (left leg weakness)      PEG  Assessment   What number best describes your pain on average in the past week?5  What number best describes how, during the past week, pain has interfered with your enjoyment of life?5  What number best describes how, during the past week, pain has interfered with your general activity?  5        Current Outpatient Medications:   •  Acetaminophen 325 MG capsule, , Disp: , Rfl:   •  albuterol sulfate  (90 Base) MCG/ACT inhaler, Inhale 2 puffs 4 (Four) Times a Day As Needed., Disp: , Rfl:   •  aspirin (aspirin) 81 MG EC tablet, Take 81 mg by mouth., Disp: , Rfl:   •  atorvastatin (LIPITOR) 40 MG tablet, Take 40 mg by mouth Daily., Disp: , Rfl:   •  Calcium Carb-Cholecalciferol (Calcium 1000 + D) 1000-800 MG-UNIT tablet, Take 1,200 mg by mouth Daily., Disp: , Rfl:   •  calcium carbonate-vitamin d 600-400 MG-UNIT per tablet, Take  by mouth., Disp: , Rfl:   •  DULoxetine (CYMBALTA) 60 MG capsule, , Disp: , Rfl:   •  famotidine (PEPCID) 40 MG tablet, Take 40 mg by mouth every night at bedtime., Disp: , Rfl:   •  Fluticasone-Umeclidin-Vilant (TRELEGY) 200-62.5-25 MCG/INH inhaler, Inhale 1 puff., Disp: , Rfl:   •  gabapentin (NEURONTIN) 600 MG tablet, , Disp: , Rfl:   •  levothyroxine (SYNTHROID, LEVOTHROID) 50 MCG tablet, , Disp: , Rfl:   •  meloxicam (MOBIC) 7.5 MG tablet, , Disp: , Rfl:   •  metoprolol succinate XL (TOPROL-XL) 25 MG 24 hr tablet, , Disp: , Rfl:   •  oxyCODONE (ROXICODONE) 10 MG tablet, , Disp: , Rfl:   •  predniSONE (DELTASONE) 5 MG tablet, Take 5 mg by mouth Daily., Disp: , Rfl:   •  Stiolto Respimat 2.5-2.5 MCG/ACT aerosol solution inhaler, , Disp: , Rfl:   •  tiotropium bromide-olodaterol (STIOLTO RESPIMAT) 2.5-2.5 MCG/ACT aerosol solution inhaler, Inhale 2 puffs., Disp: , Rfl:   •  Trelegy Ellipta 200-62.5-25 MCG/INH inhaler, INHALE 1 PUFF 1 (ONE) TIME EACH DAY., Disp: , Rfl:   •  celecoxib (CeleBREX) 100 MG capsule, , Disp: , Rfl:   •  diclofenac (VOLTAREN) 50 MG EC tablet, Take 50 mg by  mouth 2 (Two) Times a Day., Disp: , Rfl:   •  naloxone (NARCAN) 4 MG/0.1ML nasal spray, 1 spray into the nostril(s) as directed by provider As Needed (narcotic overdose, respiratory depression) for up to 30 days., Disp: 1 each, Rfl: 0  •  [START ON 2/6/2022] oxyCODONE-acetaminophen (PERCOCET)  MG per tablet, Take 1 tablet by mouth Every 4 (Four) Hours As Needed for Severe Pain  for up to 30 days., Disp: 180 tablet, Rfl: 0    The following portions of the patient's history were reviewed and updated as appropriate: allergies, current medications, past family history, past medical history, past social history, past surgical history, and problem list.      REVIEW OF PERTINENT MEDICAL DATA    Past Medical History:   Diagnosis Date   • Anemia    • Back pain    • Constipation    • COPD (chronic obstructive pulmonary disease) ([facility])    • Extremity pain     Left leg   • History of heart attack    • HTN (hypertension)    • Hyperlipidemia    • Ileostomy, has currently ([facility])    • Low back pain    • Low serum cortisol level ([facility])    • ROCIO (obstructive sleep apnea)    • Osteoporosis    • Rectal cancer ([facility])     Colorectal surgery     Past Surgical History:   Procedure Laterality Date   • BACK SURGERY     • CHOLECYSTECTOMY     • CORONARY ANGIOPLASTY     • HERNIA REPAIR     • ILEOSTOMY     • RECTAL SURGERY       Family History   Problem Relation Age of Onset   • Leukemia Mother    • Heart disease Father    • Lung disease Father    • Vaginal cancer Sister    • Diabetes Brother    • Hypertension Brother      Social History     Socioeconomic History   • Marital status:    Tobacco Use   • Smoking status: Former Smoker   • Smokeless tobacco: Never Used   • Tobacco comment: Quit 06/2021   Vaping Use   • Vaping Use: Every day   • Substances: Nicotine   Substance and Sexual Activity   • Alcohol use: Not Currently   • Drug use: Never   • Sexual activity: Defer         Review of Systems   Musculoskeletal: Positive for  "arthralgias, back pain and gait problem.         Vitals:    01/25/22 1012   Pulse: 81   Resp: 16   SpO2: 95%   Weight: 90.3 kg (199 lb)   Height: 177.8 cm (70\")   PainSc:   4         Objective   Physical Exam  Musculoskeletal:         General: Tenderness present.        Legs:    Neurological:      Deep Tendon Reflexes:      Reflex Scores:       Patellar reflexes are 2+ on the right side and 2+ on the left side.       Achilles reflexes are 2+ on the right side and 2+ on the left side.     Comments: Motor strength 5/5 b/l LE- except 4/5 on left hip flexion and knee extension  Sensory intact b/l LE             Imaging Reviewed:    MRI lumbar spine without contrast -12/23/2021 obtained from [facility] and reviewed independently.  T11-12 mild right and moderate left neuroforaminal stenosis.  T12-L1-mild symmetric disc bulge with bilateral neuroforaminal stenosis-mild.  L1-2-mild to moderate thecal sac stenosis.  Mild bilateral foraminal stenosis, right greater than left.  L2-3-severe thecal sac stenosis with impingement of the bilateral transiting cauda equina nerve roots due to disc bulge and facet hypertrophy.  Moderate bilateral neural foraminal stenosis.  L3-4--bilateral facet hypertrophy.  Symmetric disc bulge with central disc extrusion measuring 3 x 6 mm.  Moderate thecal sac and bilateral subarticular recess stenosis with contact of bilateral transiting L4 nerve roots without overt L4 nerve root displacement/impingement.  Moderate right and mild to moderate left neural foraminal stenosis present.  L4-5-right hemilaminectomy defect.  Mild facet arthropathy.  Symmetric disc bulge with superimposed bilateral foraminal disc protrusion.  Mild thecal sac stenosis and severe bilateral neural foraminal stenosis with probable bilateral L4 nerve root impingement.  L5-S1-mild facet hypertrophy.  Symmetric disc bulge with superimposed bilateral foraminal disc protrusion.  Mild thecal sac stenosis.  Severe bilateral " neuroforaminal stenosis with bilateral exiting L5 nerve root impingement.  L1-moderate compression deformity with approximately 45% anterior vertebral body height loss and central vertebral body osseous cleft measuring approximately 10 mm in maximum AP diameter.  L3-30% central vertebral body height loss.  Likely subacute fracture.      MRI lumbar spine with IV contrast-4/2020  L2-3-diffuse disc bulge causing mild canal stenosis.  L3-4-central disc herniation displacing the thecal sac posteriorly causing moderate canal narrowing.  Mild bilateral foraminal encroachment by disc bulge.  B0-5-tscptijlcdb hypertrophy.  Laminectomy on the right.  Moderate to marked bilateral foraminal stenosis.  L5-S1-disc bulge with central annular tear.  Facet hypertrophy and ligamentous hypertrophy causes moderate to marked bilateral foraminal narrowing.    MRI cervical spine with IV contrast-4/2020  -C4-5-mild disc bulge.  Mild bilateral neural foraminal stenosis.  L6-6-vmzceftpv joint hypertrophy with left paracentral protrusion versus tiny herniation.  AP dimension 10 mm.  C6-7-left paracentral disc herniation measuring 10 x 6 x 5 mm with mild inferior migration.  Ligamentum flavum hypertrophy causing central canal stenosis.  AP diameter 6 to 7 mm.  C7-T1 no significant abnormality.    Thoracic MRI with IV contrast-4/2020  -T7-8 right paracentral disc herniation measuring 15 x 9 x 7 mm which effaces the thoracic spinal cord and decreases AP dimension to 3 to 4 mm.  -Small right paracentral T5-6 disc herniation versus protrusion.     Assessment:    1. DDD (degenerative disc disease), lumbar    2. Lumbar stenosis without neurogenic claudication    3. Compression fracture of L1 vertebra with routine healing, subsequent encounter    4. Compression fracture of L3 vertebra with routine healing, subsequent encounter    5. Opioid use         Plan:     Plan:  1. New patient visit, urine drug screen per office policy. UDS today to monitor for  compliance with medication use. The UDS is medically necessary to monitor for compliance due to the potential side effects and complications of misuse of opioids. UDS done today will review on next visit.   POC consistent with patient's interview.  Narcotic contract signed and Narcan sent on 1/25/2022.  2. We discussed trying a course of formal physical therapy.  Physical therapy can help strengthen and stretch the muscles around the joints. Continue to be as active as possible.  He has done significant physical therapy without much improvement.  He also actively does home exercises.  4.  POC UDS is consistent with patient's interview.  We will go ahead and take more Percocet  mg Q4H PRN. Discussed with the patient regarding long-term side effects of opioids including but not limited to opioid induced hormonal suppression, hyperalgesia, fatigue, weight gain, possible opioid induced altered immune system, addiction, tolerance, dependence, risk of hearing loss and elevated risk of myocardial infarction. Proper use and potential life threatening side effects of over use discussed with patient. Patient states understanding of their use and risks.  5.  Discussed with patient that in future he may benefit from extended release medication such as morphine to control his pain better.  We will discuss it further in future.  6.  Patient's lower back pain and radicular pain is multifactorial.  Some of his axial lower back pain is originating from the compression fracture from L1 and L3.  Patient was advised to have kyphoplasty done by spine surgeon previously.  Advised patient that this would be a good idea to improve his axial lower back pain.  7.  Patient's radicular pain is in distribution of L4 and L5 dermatomes.  MRI also shows impingement of L4 and L5 nerve roots.  SLR is positive on left. Patient has pain in the lower back with referred pain in the leg, patient has failed conservative therapy including PT and  pharmacological management for more than 6 weeks and pain interferes with activities of daily living.. Discussed LEFT TFESI L4-5, L5-S1. Discussed the possibility of infection, bleeding, nerve damage, post dural puncture headache, increased pain, paraplegia. Patient understands and agrees.  We will discuss injections further after he is done with ileostomy procedure.    RTC on 2/15/2022.     Benja Silverman DO  Pain Management   [facility]         INSPECT REPORT    As part of the patient's treatment plan, I may be prescribing controlled substances. The patient has been made aware of appropriate use of such medications, including potential risk of somnolence, limited ability to drive and/or work safely, and the potential for dependence or overdose. It has also been made clear that these medications are for use by this patient only, without concomitant use of alcohol or other substances unless prescribed.     Patient has completed prescribing agreement detailing terms of continued prescribing of controlled substances, including monitoring INSPECT reports, urine drug screening, and pill counts if necessary. The patient is aware that inappropriate use will results in cessation of prescribing such medications.    INSPECT report has been reviewed and scanned into the patient's chart.      EMR Dragon/Transcription Disclaimer:   Much of this encounter note is an electronic transcription/translation of spoken language to printed text. The electronic translation of spoken language may permit erroneous, or at times, nonsensical words or phrases to be inadvertently transcribed; Although I have reviewed the note for such errors, some may still exist.

## 2022-01-25 ENCOUNTER — OFFICE VISIT (OUTPATIENT)
Dept: PAIN MEDICINE | Facility: CLINIC | Age: 62
End: 2022-01-25

## 2022-01-25 VITALS
RESPIRATION RATE: 16 BRPM | HEART RATE: 81 BPM | BODY MASS INDEX: 28.49 KG/M2 | WEIGHT: 199 LBS | HEIGHT: 70 IN | OXYGEN SATURATION: 95 %

## 2022-01-25 DIAGNOSIS — Z79.899 ENCOUNTER FOR LONG-TERM (CURRENT) USE OF OTHER MEDICATIONS: ICD-10-CM

## 2022-01-25 DIAGNOSIS — M48.061 LUMBAR STENOSIS WITHOUT NEUROGENIC CLAUDICATION: Primary | ICD-10-CM

## 2022-01-25 DIAGNOSIS — S32.010D COMPRESSION FRACTURE OF L1 VERTEBRA WITH ROUTINE HEALING, SUBSEQUENT ENCOUNTER: ICD-10-CM

## 2022-01-25 DIAGNOSIS — S32.030D COMPRESSION FRACTURE OF L3 VERTEBRA WITH ROUTINE HEALING, SUBSEQUENT ENCOUNTER: ICD-10-CM

## 2022-01-25 DIAGNOSIS — F11.90 OPIOID USE: ICD-10-CM

## 2022-01-25 DIAGNOSIS — M51.36 DDD (DEGENERATIVE DISC DISEASE), LUMBAR: ICD-10-CM

## 2022-01-25 PROBLEM — S32.010A COMPRESSION FRACTURE OF L1 LUMBAR VERTEBRA: Status: ACTIVE | Noted: 2022-01-25

## 2022-01-25 PROCEDURE — 99205 OFFICE O/P NEW HI 60 MIN: CPT | Performed by: STUDENT IN AN ORGANIZED HEALTH CARE EDUCATION/TRAINING PROGRAM

## 2022-01-25 RX ORDER — LEVOTHYROXINE SODIUM 0.05 MG/1
TABLET ORAL
COMMUNITY
Start: 2022-01-11

## 2022-01-25 RX ORDER — OXYCODONE AND ACETAMINOPHEN 10; 325 MG/1; MG/1
1 TABLET ORAL EVERY 4 HOURS PRN
Qty: 180 TABLET | Refills: 0 | Status: SHIPPED | OUTPATIENT
Start: 2022-02-06 | End: 2022-03-08

## 2022-01-25 RX ORDER — DULOXETIN HYDROCHLORIDE 60 MG/1
CAPSULE, DELAYED RELEASE ORAL
COMMUNITY
Start: 2021-12-26

## 2022-01-25 RX ORDER — MELOXICAM 7.5 MG/1
TABLET ORAL
COMMUNITY
Start: 2022-01-22 | End: 2022-06-23

## 2022-01-25 RX ORDER — TIOTROPIUM BROMIDE AND OLODATEROL 3.124; 2.736 UG/1; UG/1
SPRAY, METERED RESPIRATORY (INHALATION)
COMMUNITY
Start: 2022-01-21 | End: 2022-06-23

## 2022-01-25 RX ORDER — CELECOXIB 100 MG/1
CAPSULE ORAL
COMMUNITY
Start: 2022-01-24 | End: 2022-06-23

## 2022-01-25 RX ORDER — METOPROLOL SUCCINATE 25 MG/1
TABLET, EXTENDED RELEASE ORAL
COMMUNITY
Start: 2022-01-23 | End: 2022-12-22

## 2022-01-25 RX ORDER — NALOXONE HYDROCHLORIDE 4 MG/.1ML
1 SPRAY NASAL AS NEEDED
Qty: 1 EACH | Refills: 0 | Status: SHIPPED | OUTPATIENT
Start: 2022-01-25 | End: 2022-02-24

## 2022-02-04 ENCOUNTER — TELEPHONE (OUTPATIENT)
Dept: PAIN MEDICINE | Facility: CLINIC | Age: 62
End: 2022-02-04

## 2022-02-04 DIAGNOSIS — Z79.899 ENCOUNTER FOR LONG-TERM (CURRENT) USE OF OTHER MEDICATIONS: ICD-10-CM

## 2022-02-04 NOTE — TELEPHONE ENCOUNTER
Provider: DR GARCES    Caller: VARSHA BOLTON    Relationship to Patient: SELF    Pharmacy: [facility]    Phone Number: [phone]    Reason for Call: PATIENT STATING THAT HE DOES NOT NEED OXYCODONE REFILL BECAUSE HE ALREADY GOT ONE FROM HIS PAIN MANAGEMENT

## 2022-03-14 ENCOUNTER — CLINICAL SUPPORT (OUTPATIENT)
Dept: FAMILY MEDICINE CLINIC | Facility: CLINIC | Age: 62
End: 2022-03-14

## 2022-03-14 DIAGNOSIS — M47.27 OSTEOARTHRITIS OF SPINE WITH RADICULOPATHY, LUMBOSACRAL REGION: ICD-10-CM

## 2022-03-14 DIAGNOSIS — D50.9 IRON DEFICIENCY ANEMIA, UNSPECIFIED IRON DEFICIENCY ANEMIA TYPE: ICD-10-CM

## 2022-03-14 DIAGNOSIS — C20 RECTAL CANCER: ICD-10-CM

## 2022-03-14 DIAGNOSIS — M54.17 LUMBOSACRAL RADICULOPATHY: ICD-10-CM

## 2022-03-14 LAB
BASOPHILS # BLD AUTO: 0 10*3/MM3 (ref 0–0.2)
BASOPHILS NFR BLD AUTO: 0.8 % (ref 0–1.5)
DEPRECATED RDW RBC AUTO: 49.9 FL (ref 37–54)
EOSINOPHIL # BLD AUTO: 0.4 10*3/MM3 (ref 0–0.4)
EOSINOPHIL NFR BLD AUTO: 7.9 % (ref 0.3–6.2)
ERYTHROCYTE [DISTWIDTH] IN BLOOD BY AUTOMATED COUNT: 15.1 % (ref 12.3–15.4)
FERRITIN SERPL-MCNC: 101.6 NG/ML (ref 30–400)
HCT VFR BLD AUTO: 37.7 % (ref 37.5–51)
HGB BLD-MCNC: 12.7 G/DL (ref 13–17.7)
IRON 24H UR-MRATE: 71 MCG/DL (ref 59–158)
IRON SATN MFR SERPL: 20 % (ref 20–50)
LYMPHOCYTES # BLD AUTO: 1.1 10*3/MM3 (ref 0.7–3.1)
LYMPHOCYTES NFR BLD AUTO: 18.7 % (ref 19.6–45.3)
MCH RBC QN AUTO: 31.8 PG (ref 26.6–33)
MCHC RBC AUTO-ENTMCNC: 33.7 G/DL (ref 31.5–35.7)
MCV RBC AUTO: 94.2 FL (ref 79–97)
MONOCYTES # BLD AUTO: 0.5 10*3/MM3 (ref 0.1–0.9)
MONOCYTES NFR BLD AUTO: 9.2 % (ref 5–12)
NEUTROPHILS NFR BLD AUTO: 3.6 10*3/MM3 (ref 1.7–7)
NEUTROPHILS NFR BLD AUTO: 63.4 % (ref 42.7–76)
NRBC BLD AUTO-RTO: 0.1 /100 WBC (ref 0–0.2)
PLATELET # BLD AUTO: 254 10*3/MM3 (ref 140–450)
PMV BLD AUTO: 8 FL (ref 6–12)
RBC # BLD AUTO: 4 10*6/MM3 (ref 4.14–5.8)
TIBC SERPL-MCNC: 361 MCG/DL (ref 298–536)
TRANSFERRIN SERPL-MCNC: 242 MG/DL (ref 200–360)
WBC NRBC COR # BLD: 5.6 10*3/MM3 (ref 3.4–10.8)

## 2022-03-14 PROCEDURE — 36415 COLL VENOUS BLD VENIPUNCTURE: CPT | Performed by: INTERNAL MEDICINE

## 2022-03-14 PROCEDURE — 84466 ASSAY OF TRANSFERRIN: CPT | Performed by: INTERNAL MEDICINE

## 2022-03-14 PROCEDURE — 85025 COMPLETE CBC W/AUTO DIFF WBC: CPT | Performed by: INTERNAL MEDICINE

## 2022-03-14 PROCEDURE — 82728 ASSAY OF FERRITIN: CPT | Performed by: INTERNAL MEDICINE

## 2022-03-14 PROCEDURE — 83540 ASSAY OF IRON: CPT | Performed by: INTERNAL MEDICINE

## 2022-03-14 NOTE — PROGRESS NOTES
Venipuncture Blood Specimen Collection  Venipuncture performed in (R) AC via butterfly by Barbie Maravilla LPN with good hemostasis. Patient tolerated the procedure well without complications.   03/14/22   Barbie Maravilla LPN

## 2022-04-28 ENCOUNTER — TELEPHONE (OUTPATIENT)
Dept: ONCOLOGY | Facility: CLINIC | Age: 62
End: 2022-04-28

## 2022-06-13 ENCOUNTER — HOSPITAL ENCOUNTER (OUTPATIENT)
Dept: CT IMAGING | Facility: HOSPITAL | Age: 62
Discharge: HOME | End: 2022-06-13
Admitting: INTERNAL MEDICINE

## 2022-06-13 DIAGNOSIS — M54.17 LUMBOSACRAL RADICULOPATHY: ICD-10-CM

## 2022-06-13 DIAGNOSIS — M47.27 OSTEOARTHRITIS OF SPINE WITH RADICULOPATHY, LUMBOSACRAL REGION: ICD-10-CM

## 2022-06-13 DIAGNOSIS — C20 RECTAL CANCER: ICD-10-CM

## 2022-06-13 LAB
CREAT BLDA-MCNC: 1.1 MG/DL (ref 0.6–1.3)
EGFRCR SERPLBLD CKD-EPI 2021: 76.4 ML/MIN/1.73

## 2022-06-13 PROCEDURE — 71260 CT THORAX DX C+: CPT

## 2022-06-13 PROCEDURE — 0 IOPAMIDOL PER 1 ML: Performed by: INTERNAL MEDICINE

## 2022-06-13 PROCEDURE — 82565 ASSAY OF CREATININE: CPT

## 2022-06-13 PROCEDURE — 74177 CT ABD & PELVIS W/CONTRAST: CPT

## 2022-06-13 RX ADMIN — IOPAMIDOL 100 ML: 755 INJECTION, SOLUTION INTRAVENOUS at 10:04

## 2022-06-16 ENCOUNTER — CLINICAL SUPPORT (OUTPATIENT)
Dept: FAMILY MEDICINE CLINIC | Facility: CLINIC | Age: 62
End: 2022-06-16

## 2022-06-16 DIAGNOSIS — C20 RECTAL CANCER: ICD-10-CM

## 2022-06-16 DIAGNOSIS — C20 RECTAL CANCER: Primary | ICD-10-CM

## 2022-06-16 DIAGNOSIS — D50.9 IRON DEFICIENCY ANEMIA, UNSPECIFIED IRON DEFICIENCY ANEMIA TYPE: ICD-10-CM

## 2022-06-16 LAB
BASOPHILS # BLD AUTO: 0.1 10*3/MM3 (ref 0–0.2)
BASOPHILS NFR BLD AUTO: 0.9 % (ref 0–1.5)
DEPRECATED RDW RBC AUTO: 56.4 FL (ref 37–54)
EOSINOPHIL # BLD AUTO: 0.2 10*3/MM3 (ref 0–0.4)
EOSINOPHIL NFR BLD AUTO: 2 % (ref 0.3–6.2)
ERYTHROCYTE [DISTWIDTH] IN BLOOD BY AUTOMATED COUNT: 16.3 % (ref 12.3–15.4)
FERRITIN SERPL-MCNC: 114.3 NG/ML (ref 30–400)
HCT VFR BLD AUTO: 36.1 % (ref 37.5–51)
HGB BLD-MCNC: 11.7 G/DL (ref 13–17.7)
IRON 24H UR-MRATE: 63 MCG/DL (ref 59–158)
IRON SATN MFR SERPL: 17 % (ref 20–50)
LYMPHOCYTES # BLD AUTO: 1.2 10*3/MM3 (ref 0.7–3.1)
LYMPHOCYTES NFR BLD AUTO: 15.4 % (ref 19.6–45.3)
MCH RBC QN AUTO: 31.6 PG (ref 26.6–33)
MCHC RBC AUTO-ENTMCNC: 32.5 G/DL (ref 31.5–35.7)
MCV RBC AUTO: 97.3 FL (ref 79–97)
MONOCYTES # BLD AUTO: 0.6 10*3/MM3 (ref 0.1–0.9)
MONOCYTES NFR BLD AUTO: 7.5 % (ref 5–12)
NEUTROPHILS NFR BLD AUTO: 5.6 10*3/MM3 (ref 1.7–7)
NEUTROPHILS NFR BLD AUTO: 74.2 % (ref 42.7–76)
NRBC BLD AUTO-RTO: 0.1 /100 WBC (ref 0–0.2)
PLATELET # BLD AUTO: 246 10*3/MM3 (ref 140–450)
PMV BLD AUTO: 7.5 FL (ref 6–12)
RBC # BLD AUTO: 3.71 10*6/MM3 (ref 4.14–5.8)
TIBC SERPL-MCNC: 370 MCG/DL (ref 298–536)
TRANSFERRIN SERPL-MCNC: 248 MG/DL (ref 200–360)
WBC NRBC COR # BLD: 7.5 10*3/MM3 (ref 3.4–10.8)

## 2022-06-16 PROCEDURE — 36415 COLL VENOUS BLD VENIPUNCTURE: CPT | Performed by: INTERNAL MEDICINE

## 2022-06-16 PROCEDURE — 83540 ASSAY OF IRON: CPT | Performed by: INTERNAL MEDICINE

## 2022-06-16 PROCEDURE — 84466 ASSAY OF TRANSFERRIN: CPT | Performed by: INTERNAL MEDICINE

## 2022-06-16 PROCEDURE — 85025 COMPLETE CBC W/AUTO DIFF WBC: CPT | Performed by: INTERNAL MEDICINE

## 2022-06-16 PROCEDURE — 82728 ASSAY OF FERRITIN: CPT | Performed by: INTERNAL MEDICINE

## 2022-06-16 NOTE — PROGRESS NOTES
HEMATOLOGY ONCOLOGY OUTPATIENT FOLLOW UP      Patient name: Kit Harris  : 1960  MRN: [id number]  Primary Care Physician: Eliu Richards  Referring Physician: Eliu Richards  Reason For Consult:     Chief Complaint   Patient presents with   • Follow-up     Rectal cancer        HPI:   History of Present Illness:  Kit Harris is 62 y.o. male who presented to our office on 21 for consultation regarding  Rectal adenocarcinoma status post multimodality treatment.    Patient had a heart attack in 2018. Patient had a stent placed and was started on dual antiplatelt therapy with aspirin and Brillinta. Patient has had minimal blood in stools prior to this. Patient was known to have fissure in the past and he always thought that was the cause of his blood in stools. When the patient started on anticoagulant, patient had worsening of his bleeding. He was then referred to see Dr. Bateman and had an EGD/colonsocopy    2019 - EGD/colon- moderate inactive gastritis. Reflux changes. Adenocarcinoma of the rectum. Large polyp right colon.  Biopsy confirmed adenocarcinoma of the rectum. Polypectomy revealed tubular adenoma.  2019 EUS revealed a large partially obstructing mass in the rectum. With clear invasion of muscularis propria. At least one 4 x 6 mm lymph node was appreciated. Presumptive diagnosis of T3 N1 rectal cancer.  MRI confirms diagnosis , PET with possible liver lesion , negative on MRI  Started on neoadjuvant treatment with XELODA and radiation    19 - C1D1 XelodaRT  10/3/19 - completed radiation treatment.  10/30/19 - Repeat MRI with good response based on imaging, no focal rectal mass lesion seen, decreased size of lymph node  19- Low anterior resection by Dr. Rich in [place]. This was complicated by Abscess, WILL post surgery.   Final path with ypT2N0 staging 0/20 LN positive  20 - C1D1 XELOX  20 C2D1  20 - C3D1  With  intolerance, treatment changed to single agent xeloda and eventually stopped   7/2020 - CT imaging with mildly increased adenopathy in the abdomen  11/2020 - CT imaging with improvement in presacral area, no evidence of disease.  5/2021 - CT CAP interval development of wedge compression fracture. No lytic lesions. No evidence of metastatic disease.  6/2021 -patient was admitted to [facility] for reversal of ostomy.  He had complications after the procedure with anastomotic leak requiring emergency surgery and repeat ileostomy.  He had aspiration pneumonia was on the ventilator and eventually recovered.  He is discharged has a wound VAC in place.  July 11, 2021 -CT chest abdomen pelvis with contrast showing no evidence of recurrence of disease.  August 2021 -CEA 2.6.  November 2021 -CT chest abdomen pelvis with stable postoperative changes, stable compression fractures, stable fracture of the sacrum.  No evidence of new or recurrent disease.  December 2021 -CEA 1.9.  June 2022 - CT imaging with continued fistula, no evidence of recurrent malignancy.      Subjective:  Still has ongoing rectal bleeding intermittently.    The following portions of the patient's history were reviewed and updated as appropriate: allergies, current medications, past family history, past medical history, past social history, past surgical history and problem list.    Past Medical History:   Diagnosis Date   • Anemia    • Back pain    • Constipation    • COPD (chronic obstructive pulmonary disease) (HCC)    • Extremity pain     Left leg   • History of heart attack    • HTN (hypertension)    • Hyperlipidemia    • Ileostomy, has currently ([facility])    • Low back pain    • Low serum cortisol level (HCC)    • ROCIO (obstructive sleep apnea)    • Osteoporosis    • Rectal cancer (HCC)     Colorectal surgery       Past Surgical History:   Procedure Laterality Date   • BACK SURGERY     • CHOLECYSTECTOMY     • CORONARY ANGIOPLASTY     • HERNIA REPAIR      • ILEOSTOMY     • RECTAL SURGERY           Current Outpatient Medications:   •  Acetaminophen 325 MG capsule, , Disp: , Rfl:   •  albuterol sulfate  (90 Base) MCG/ACT inhaler, Inhale 2 puffs 4 (Four) Times a Day As Needed., Disp: , Rfl:   •  aspirin 81 MG EC tablet, Take 81 mg by mouth., Disp: , Rfl:   •  atorvastatin (LIPITOR) 40 MG tablet, Take 40 mg by mouth Daily., Disp: , Rfl:   •  buPROPion SR (WELLBUTRIN SR) 100 MG 12 hr tablet, Take 100 mg by mouth 2 (Two) Times a Day., Disp: , Rfl:   •  Calcium Carb-Cholecalciferol (Calcium 1000 + D) 1000-800 MG-UNIT tablet, Take 1,200 mg by mouth Daily., Disp: , Rfl:   •  calcium carbonate-vitamin d 600-400 MG-UNIT per tablet, Take  by mouth., Disp: , Rfl:   •  chlorproMAZINE (THORAZINE) 25 MG tablet, Take 25 mg by mouth 3 (Three) Times a Day., Disp: , Rfl:   •  diclofenac (VOLTAREN) 50 MG EC tablet, Take 50 mg by mouth 2 (Two) Times a Day., Disp: , Rfl:   •  DULoxetine (CYMBALTA) 60 MG capsule, , Disp: , Rfl:   •  famotidine (PEPCID) 40 MG tablet, Take 40 mg by mouth every night at bedtime., Disp: , Rfl:   •  Fluticasone-Umeclidin-Vilant (TRELEGY) 200-62.5-25 MCG/INH inhaler, Inhale 1 puff., Disp: , Rfl:   •  gabapentin (NEURONTIN) 600 MG tablet, , Disp: , Rfl:   •  levothyroxine (SYNTHROID, LEVOTHROID) 50 MCG tablet, , Disp: , Rfl:   •  metoprolol succinate XL (TOPROL-XL) 25 MG 24 hr tablet, , Disp: , Rfl:   •  oxyCODONE (ROXICODONE) 10 MG tablet, , Disp: , Rfl:   •  predniSONE (DELTASONE) 5 MG tablet, Take 5 mg by mouth Daily., Disp: , Rfl:   •  Trelegy Ellipta 200-62.5-25 MCG/INH inhaler, INHALE 1 PUFF 1 (ONE) TIME EACH DAY., Disp: , Rfl:     Allergies   Allergen Reactions   • Adhesive Tape Unknown - Low Severity     Other reaction(s): Multiple skin tears   • Tape Irritability       Family History   Problem Relation Age of Onset   • Leukemia Mother    • Heart disease Father    • Lung disease Father    • Vaginal cancer Sister    • Diabetes Brother    •  "Hypertension Brother        Cancer-related family history includes Vaginal cancer in his sister.    Social History     Tobacco Use   • Smoking status: Former Smoker   • Smokeless tobacco: Never Used   • Tobacco comment: Quit 06/2021   Vaping Use   • Vaping Use: Every day   • Substances: Nicotine   Substance Use Topics   • Alcohol use: Not Currently   • Drug use: Never     Social History     Social History Narrative   • Not on file          Objective:    Vitals:    06/23/22 1051   BP: 147/81   Pulse: 75   Resp: 18   Temp: 98.4 °F (36.9 °C)   SpO2: 96%   Weight: 101 kg (222 lb 3.2 oz)   Height: 177.8 cm (70\")   PainSc:   4   PainLoc: Back     Body mass index is 31.88 kg/m².  ECOG  (1) Restricted in physically strenuous activity, ambulatory and able to do work of light nature    Physical Exam:     Physical Exam  Constitutional:       Appearance: Normal appearance.   HENT:      Head: Normocephalic and atraumatic.   Eyes:      Pupils: Pupils are equal, round, and reactive to light.   Cardiovascular:      Rate and Rhythm: Normal rate and regular rhythm.      Pulses: Normal pulses.      Heart sounds: No murmur heard.  Pulmonary:      Effort: Pulmonary effort is normal.      Breath sounds: Normal breath sounds.   Abdominal:      General: There is no distension.      Palpations: Abdomen is soft. There is no mass.      Tenderness: There is no abdominal tenderness.   Musculoskeletal:         General: Normal range of motion.      Cervical back: Normal range of motion.   Skin:     General: Skin is warm.   Neurological:      General: No focal deficit present.      Mental Status: He is alert.   Psychiatric:         Mood and Affect: Mood normal.         Lab Results - Last 18 Months   Lab Units 06/16/22  1121 03/14/22  1023 12/16/21  1056   WBC 10*3/mm3 7.50 5.60 7.20   HEMOGLOBIN g/dL 11.7* 12.7* 11.6*   HEMATOCRIT % 36.1* 37.7 34.4*   PLATELETS 10*3/mm3 246 254 294   MCV fL 97.3* 94.2 90.6     Lab Results - Last 18 Months   Lab " Units 06/13/22  0941 12/16/21  1056 11/29/21  1115 11/29/21  1018 07/09/21  0605 07/08/21  1710 07/08/21  0442   SODIUM mmol/L  --  140 134*  --   --   --   --    POTASSIUM mmol/L  --  4.0 4.6  --  3.4*   < >  --    CHLORIDE mmol/L  --  101 95*  --   --   --   --    CO2 mmol/L  --  28.0 26.0  --   --   --   --    BUN mg/dL  --  10 24*  --   --   --   --    CREATININE mg/dL 1.10 1.04 1.08   < >  --   --   --    CALCIUM mg/dL  --  9.3 9.3  --   --   --   --    BILIRUBIN mg/dL  --  0.3 0.3  --   --   --  0.4   ALK PHOS U/L  --  120* 119*  --   --   --  190*   ALT (SGPT) U/L  --  19 40  --   --   --  79*   AST (SGOT) U/L  --  15 31  --   --   --  71*   GLUCOSE mg/dL  --  91 88  --   --   --   --     < > = values in this interval not displayed.       Lab Results   Component Value Date    GLUCOSE 91 12/16/2021    BUN 10 12/16/2021    CREATININE 1.10 06/13/2022    EGFRIFNONA 73 12/16/2021    BCR 9.6 12/16/2021    K 4.0 12/16/2021    CO2 28.0 12/16/2021    CALCIUM 9.3 12/16/2021    ALBUMIN 4.10 12/16/2021    LABIL2 0.9 (L) 07/08/2021    AST 15 12/16/2021    ALT 19 12/16/2021       Lab Results - Last 18 Months   Lab Units 07/03/21  1841   INR INR 1.4   APTT s 34.5       Lab Results   Component Value Date    IRON 63 06/16/2022    TIBC 370 06/16/2022    FERRITIN 114.30 06/16/2022       No results found for: FOLATE    No results found for: OCCULTBLD    No results found for: RETICCTPCT    No results found for: JQQUFMAM70  No results found for: SPEP, UPEP  Uric Acid   Date Value Ref Range Status   11/25/2019 7.1 2.5 - 8.5 mg/dL Final     No results found for: TAN, RF, SEDRATE  Lab Results   Component Value Date    FIBRINOGEN 576 (H) 07/03/2021     Lab Results   Component Value Date    PTT 34.5 07/03/2021    INR 1.4 07/03/2021     No results found for:   Lab Results   Component Value Date    CEA 2.82 12/16/2021     No components found for: CA-19-9  No results found for: PSA  No results found for: AFPTM, MK7185, HCGTM, SPEP,  LARISSA         Assessment & Plan     Patient is a 61-year-old male with T3 N1 M0 rectal adenocarcinoma. He has completed treatment with neoadjuvant chemoradiation with Xeloda s/p LAR and finished adjuvant treatment with XELOX    Rectal adenocarcinoma  T3N1 disease preoperatively  downstaged to ypT2N0 post surgery with no lymph nodes positive  I discussed adjuvant chemotherapy for a total of 4 months with XELOX based on NCCN guidelines, significant benefit even with downstaged after neoadjuvant chemoradiation. Benefit is not very significant in patients with CR however patient had a MD and would still have benefit.   Patient was agreeable and was started on XELOX   C2 with worsening neuropathty affecting quality of life, hand foot disease with scaling, erythema of the hands  Dose reduction of Oxaliplatin to 75 mg/m2 and Xeloda to 1500 mg BID  C3 with worsening neuropathy, Oxaliplatin was stopped and continued single agent Xeloda. Patient has had significant toxicity with xeloda, most concerning is neurotoxicity with confusion which has improved significantly after stopping Xeloda.  With increasing toxicity, treatment was witheld. DPD enzyme assay was normal.  Patient was seen by his surgeon for ileostomy reversal.contineus to have a presacral collection and had a drain tube placed with recent imaging showing improvement in the area.  Ileostomy reversal complicated with postop anastomotic leak, aspiration pneumonia.  He has a new ileostomy.  Continue see wound care wound has been improving.  July 2021 imaging with no evidence of disease recent CEA 2.6 and normal.  November imaging with no evidence of disease recurrence.  CEA 1.9.    June CT imaging with no recurrence. cea stable at 2.82  Repeat in 6 months.subsequently annual    Rectal bleeding  With the fistula  Recommend follow up with Dr. Rich    Central Hypothyroidism  Patient is on low dose prednisone 5 mg.    Leg weakness/Balance problems  Neuropathy is likely  contributing to the same  gabapentin 600 mg QID  that has improved now.    Lower back pain  patient has seen a pain clinic, continues to be on oxycodone 10 mg as needed, also has had steroid injection , pain control better. Has had compression fracture,  bisphosphonates with Endocrine continues to be on calcium vitamin D.   Patient wants referral to pain clinic locally.    COPD   echocardiogram with normal findings  likely from COPD  ICS stopped, continues to be on Stiolto   Follows Dr. Sheffield at [facility].    Anemia  S/p 2 doses of iv venofer in summer 2021  Now he has intermittent intermittent rectal bleeding and low hemoglobin  I will check iron studies today.  Increase oral iron to once a day from every other day.  Repeat CBC and iron studies in 6 months with cea and imaging.      Follow-up in 6 months for surveillance         Thank you very much for providing the opportunity to participate in this patient’s care. Please do not hesitate to call if there are any other questions    I have reviewed and confirmed the accuracy of the patient's history: Chief complaint, HPI, ROS, Subjective and Past Family Social History as entered by the MA/HIRAM/RN.     Patricia Nguyễn MD 06/23/22

## 2022-06-16 NOTE — PROGRESS NOTES
Venipuncture Blood Specimen Collection  Venipuncture performed in (R) arm via butterfly by Barbie Maravilla LPN with good hemostasis. Patient tolerated the procedure well without complications.   06/16/22   Barbie Maravilla LPN

## 2022-06-23 ENCOUNTER — OFFICE VISIT (OUTPATIENT)
Dept: ONCOLOGY | Facility: CLINIC | Age: 62
End: 2022-06-23

## 2022-06-23 VITALS
TEMPERATURE: 98.4 F | HEIGHT: 70 IN | BODY MASS INDEX: 31.81 KG/M2 | SYSTOLIC BLOOD PRESSURE: 147 MMHG | RESPIRATION RATE: 18 BRPM | OXYGEN SATURATION: 96 % | WEIGHT: 222.2 LBS | DIASTOLIC BLOOD PRESSURE: 81 MMHG | HEART RATE: 75 BPM

## 2022-06-23 DIAGNOSIS — C20 RECTAL CANCER: Primary | ICD-10-CM

## 2022-06-23 PROCEDURE — 99214 OFFICE O/P EST MOD 30 MIN: CPT | Performed by: INTERNAL MEDICINE

## 2022-06-23 RX ORDER — BUPROPION HYDROCHLORIDE 100 MG/1
100 TABLET, EXTENDED RELEASE ORAL 2 TIMES DAILY
COMMUNITY
Start: 2022-05-28

## 2022-06-23 RX ORDER — CHLORPROMAZINE HYDROCHLORIDE 25 MG/1
25 TABLET, FILM COATED ORAL 3 TIMES DAILY
COMMUNITY
Start: 2022-04-19

## 2022-12-16 ENCOUNTER — CLINICAL SUPPORT (OUTPATIENT)
Dept: FAMILY MEDICINE CLINIC | Facility: CLINIC | Age: 62
End: 2022-12-16

## 2022-12-16 DIAGNOSIS — C20 RECTAL CANCER: ICD-10-CM

## 2022-12-16 LAB
ALBUMIN SERPL-MCNC: 3.9 G/DL (ref 3.5–5.2)
ALBUMIN/GLOB SERPL: 1.4 G/DL
ALP SERPL-CCNC: 113 U/L (ref 39–117)
ALT SERPL W P-5'-P-CCNC: 14 U/L (ref 1–41)
ANION GAP SERPL CALCULATED.3IONS-SCNC: 12 MMOL/L (ref 5–15)
AST SERPL-CCNC: 18 U/L (ref 1–40)
BASOPHILS # BLD MANUAL: 0.13 10*3/MM3 (ref 0–0.2)
BASOPHILS NFR BLD MANUAL: 2 % (ref 0–1.5)
BILIRUB SERPL-MCNC: 0.3 MG/DL (ref 0–1.2)
BUN SERPL-MCNC: 9 MG/DL (ref 8–23)
BUN/CREAT SERPL: 8.5 (ref 7–25)
CALCIUM SPEC-SCNC: 9.5 MG/DL (ref 8.6–10.5)
CHLORIDE SERPL-SCNC: 102 MMOL/L (ref 98–107)
CO2 SERPL-SCNC: 27 MMOL/L (ref 22–29)
CREAT SERPL-MCNC: 1.06 MG/DL (ref 0.76–1.27)
DEPRECATED RDW RBC AUTO: 53.8 FL (ref 37–54)
EGFRCR SERPLBLD CKD-EPI 2021: 79.8 ML/MIN/1.73
EOSINOPHIL # BLD MANUAL: 0.39 10*3/MM3 (ref 0–0.4)
EOSINOPHIL NFR BLD MANUAL: 6 % (ref 0.3–6.2)
ERYTHROCYTE [DISTWIDTH] IN BLOOD BY AUTOMATED COUNT: 15.4 % (ref 12.3–15.4)
GLOBULIN UR ELPH-MCNC: 2.8 GM/DL
GLUCOSE SERPL-MCNC: 89 MG/DL (ref 65–99)
HCT VFR BLD AUTO: 34 % (ref 37.5–51)
HGB BLD-MCNC: 11.2 G/DL (ref 13–17.7)
LYMPHOCYTES # BLD MANUAL: 1.11 10*3/MM3 (ref 0.7–3.1)
LYMPHOCYTES NFR BLD MANUAL: 4 % (ref 5–12)
MCH RBC QN AUTO: 31 PG (ref 26.6–33)
MCHC RBC AUTO-ENTMCNC: 33 G/DL (ref 31.5–35.7)
MCV RBC AUTO: 93.9 FL (ref 79–97)
MONOCYTES # BLD: 0.26 10*3/MM3 (ref 0.1–0.9)
NEUTROPHILS # BLD AUTO: 4.62 10*3/MM3 (ref 1.7–7)
NEUTROPHILS NFR BLD MANUAL: 71 % (ref 42.7–76)
PLAT MORPH BLD: NORMAL
PLATELET # BLD AUTO: 322 10*3/MM3 (ref 140–450)
PMV BLD AUTO: 7.7 FL (ref 6–12)
POTASSIUM SERPL-SCNC: 4.3 MMOL/L (ref 3.5–5.2)
PROT SERPL-MCNC: 6.7 G/DL (ref 6–8.5)
RBC # BLD AUTO: 3.62 10*6/MM3 (ref 4.14–5.8)
RBC MORPH BLD: NORMAL
SCAN SLIDE: NORMAL
SODIUM SERPL-SCNC: 141 MMOL/L (ref 136–145)
VARIANT LYMPHS NFR BLD MANUAL: 17 % (ref 19.6–45.3)
WBC MORPH BLD: NORMAL
WBC NRBC COR # BLD: 6.5 10*3/MM3 (ref 3.4–10.8)

## 2022-12-16 PROCEDURE — 36415 COLL VENOUS BLD VENIPUNCTURE: CPT | Performed by: INTERNAL MEDICINE

## 2022-12-16 PROCEDURE — 80053 COMPREHEN METABOLIC PANEL: CPT | Performed by: INTERNAL MEDICINE

## 2022-12-16 PROCEDURE — 85025 COMPLETE CBC W/AUTO DIFF WBC: CPT | Performed by: INTERNAL MEDICINE

## 2022-12-16 PROCEDURE — 85007 BL SMEAR W/DIFF WBC COUNT: CPT | Performed by: INTERNAL MEDICINE

## 2022-12-16 PROCEDURE — 82378 CARCINOEMBRYONIC ANTIGEN: CPT | Performed by: INTERNAL MEDICINE

## 2022-12-16 NOTE — PROGRESS NOTES
Venipuncture Blood Specimen Collection  Venipuncture performed in left arm by Dayna De Leon MA with good hemostasis. Patient tolerated the procedure well without complications.   12/16/22   Dayna De Leon MA

## 2022-12-17 LAB — CEA SERPL-MCNC: 3.22 NG/ML

## 2022-12-20 NOTE — PROGRESS NOTES
HEMATOLOGY ONCOLOGY OUTPATIENT FOLLOW UP      Patient name: Kit Harris  : 1960  MRN: [id number]  Primary Care Physician: Eliu Richards  Referring Physician: Eliu Richards  Reason For Consult:     Chief Complaint   Patient presents with   • Follow-up     Rectal cancer (HCC)       HPI:   History of Present Illness:  Kit Harris is 62 y.o. male who presented to our office on 21 for consultation regarding  Rectal adenocarcinoma status post multimodality treatment.    Patient had a heart attack in 2018. Patient had a stent placed and was started on dual antiplatelt therapy with aspirin and Brillinta. Patient has had minimal blood in stools prior to this. Patient was known to have fissure in the past and he always thought that was the cause of his blood in stools. When the patient started on anticoagulant, patient had worsening of his bleeding. He was then referred to see Dr. Bateman and had an EGD/colonsocopy    2019 - EGD/colon- moderate inactive gastritis. Reflux changes. Adenocarcinoma of the rectum. Large polyp right colon.  Biopsy confirmed adenocarcinoma of the rectum. Polypectomy revealed tubular adenoma.  2019 EUS revealed a large partially obstructing mass in the rectum. With clear invasion of muscularis propria. At least one 4 x 6 mm lymph node was appreciated. Presumptive diagnosis of T3 N1 rectal cancer.  MRI confirms diagnosis , PET with possible liver lesion , negative on MRI  Started on neoadjuvant treatment with XELODA and radiation    19 - C1D1 XelodaRT  10/3/19 - completed radiation treatment.  10/30/19 - Repeat MRI with good response based on imaging, no focal rectal mass lesion seen, decreased size of lymph node  19- Low anterior resection by Dr. Rich in [place]. This was complicated by Abscess, WILL post surgery.   Final path with ypT2N0 staging 0/20 LN positive  20 - C1D1 XELOX  20 C2D1  20 -  C3D1  With intolerance, treatment changed to single agent xeloda and eventually stopped   7/2020 - CT imaging with mildly increased adenopathy in the abdomen  11/2020 - CT imaging with improvement in presacral area, no evidence of disease.  5/2021 - CT CAP interval development of wedge compression fracture. No lytic lesions. No evidence of metastatic disease.  6/2021 -patient was admitted to [facility] for reversal of ostomy.  He had complications after the procedure with anastomotic leak requiring emergency surgery and repeat ileostomy.  He had aspiration pneumonia was on the ventilator and eventually recovered.  He is discharged has a wound VAC in place.  July 11, 2021 -CT chest abdomen pelvis with contrast showing no evidence of recurrence of disease.  August 2021 -CEA 2.6.  November 2021 -CT chest abdomen pelvis with stable postoperative changes, stable compression fractures, stable fracture of the sacrum.  No evidence of new or recurrent disease.  December 2021 -CEA 1.9.  June 2022 - CT imaging with continued fistula, no evidence of recurrent malignancy.  December 2022 -patient admitted to [facility] with NSTEMI needed PCI started on dual antiplatelet therapy  12/16/2022 CEA slightly increased to 3.2    Subjective:  Patient is recovering from his hospitalization doing well now.      Past Medical History:   Diagnosis Date   • Anemia    • Back pain    • Constipation    • COPD (chronic obstructive pulmonary disease) (HCC)    • Extremity pain     Left leg   • History of heart attack    • HTN (hypertension)    • Hyperlipidemia    • Ileostomy, has currently (HCC)    • Low back pain    • Low serum cortisol level    • ROCIO (obstructive sleep apnea)    • Osteoporosis    • Rectal cancer (HCC)     Colorectal surgery       Past Surgical History:   Procedure Laterality Date   • BACK SURGERY     • CHOLECYSTECTOMY     • CORONARY ANGIOPLASTY     • HERNIA REPAIR     • ILEOSTOMY     • RECTAL SURGERY           Current  Outpatient Medications:   •  Acetaminophen 325 MG capsule, , Disp: , Rfl:   •  albuterol sulfate  (90 Base) MCG/ACT inhaler, Inhale 2 puffs 4 (Four) Times a Day As Needed., Disp: , Rfl:   •  aspirin 81 MG EC tablet, Take 81 mg by mouth., Disp: , Rfl:   •  atorvastatin (LIPITOR) 80 MG tablet, Take 80 mg by mouth Daily., Disp: , Rfl:   •  buPROPion SR (WELLBUTRIN SR) 100 MG 12 hr tablet, Take 100 mg by mouth 2 (Two) Times a Day., Disp: , Rfl:   •  busPIRone (BUSPAR) 5 MG tablet, Take 5 mg by mouth 3 (Three) Times a Day., Disp: , Rfl:   •  Calcium Carb-Cholecalciferol (Calcium 1000 + D) 1000-800 MG-UNIT tablet, Take 1,200 mg by mouth Daily., Disp: , Rfl:   •  calcium carbonate-vitamin d 600-400 MG-UNIT per tablet, Take  by mouth., Disp: , Rfl:   •  chlorproMAZINE (THORAZINE) 25 MG tablet, Take 25 mg by mouth 3 (Three) Times a Day., Disp: , Rfl:   •  clopidogrel (PLAVIX) 75 MG tablet, Take 75 mg by mouth Daily., Disp: , Rfl:   •  diclofenac (VOLTAREN) 50 MG EC tablet, Take 50 mg by mouth 2 (Two) Times a Day., Disp: , Rfl:   •  DULoxetine (CYMBALTA) 60 MG capsule, , Disp: , Rfl:   •  famotidine (PEPCID) 40 MG tablet, Take 40 mg by mouth every night at bedtime., Disp: , Rfl:   •  Fluticasone-Umeclidin-Vilant (TRELEGY) 200-62.5-25 MCG/INH inhaler, Inhale 1 puff., Disp: , Rfl:   •  furosemide (LASIX) 40 MG tablet, Take 40 mg by mouth 2 (Two) Times a Day., Disp: , Rfl:   •  gabapentin (NEURONTIN) 600 MG tablet, , Disp: , Rfl:   •  hydrOXYzine (ATARAX) 25 MG tablet, TAKE 1 TABLET BY MOUTH 3 TIMES DAILY AS NEEDED FOR ITCHING., Disp: , Rfl:   •  levothyroxine (SYNTHROID, LEVOTHROID) 50 MCG tablet, , Disp: , Rfl:   •  nitroglycerin (NITROSTAT) 0.4 MG SL tablet, PLACE 1 TABLET UNDER THE TONGUE EVERY 5 MINUTES AS NEEDED FOR CHEST PAIN., Disp: , Rfl:   •  oxyCODONE (ROXICODONE) 10 MG tablet, 15 mg., Disp: , Rfl:   •  predniSONE (DELTASONE) 5 MG tablet, Take 5 mg by mouth Daily., Disp: , Rfl:   •  Trelegy Ellipta 200-62.5-25  "MCG/INH inhaler, INHALE 1 PUFF 1 (ONE) TIME EACH DAY., Disp: , Rfl:     Allergies   Allergen Reactions   • Adhesive Tape Unknown - Low Severity     Other reaction(s): Multiple skin tears   • Tape Irritability       Family History   Problem Relation Age of Onset   • Leukemia Mother    • Heart disease Father    • Lung disease Father    • Vaginal cancer Sister    • Diabetes Brother    • Hypertension Brother        Cancer-related family history includes Vaginal cancer in his sister.    Social History     Tobacco Use   • Smoking status: Former   • Smokeless tobacco: Never   • Tobacco comments:     Quit 06/2021   Vaping Use   • Vaping Use: Every day   • Substances: Nicotine   Substance Use Topics   • Alcohol use: Not Currently   • Drug use: Never     Social History     Social History Narrative   • Not on file      Objective:    Vitals:    12/22/22 1028   BP: 102/68   Pulse: 91   Resp: 18   Temp: 96.7 °F (35.9 °C)   SpO2: 98%   Weight: 93.6 kg (206 lb 6.4 oz)   Height: 177.8 cm (70\")   PainSc:   4   PainLoc: Back     Body mass index is 29.62 kg/m².  ECOG  (1) Restricted in physically strenuous activity, ambulatory and able to do work of light nature    Physical Exam:     Physical Exam  Constitutional:       Appearance: Normal appearance.   HENT:      Head: Normocephalic and atraumatic.      Mouth/Throat:      Mouth: Mucous membranes are moist.   Eyes:      Pupils: Pupils are equal, round, and reactive to light.   Cardiovascular:      Rate and Rhythm: Normal rate and regular rhythm.      Pulses: Normal pulses.      Heart sounds: No murmur heard.  Pulmonary:      Effort: Pulmonary effort is normal.      Breath sounds: Normal breath sounds.   Abdominal:      General: There is no distension.      Palpations: Abdomen is soft. There is no mass.      Tenderness: There is no abdominal tenderness.   Musculoskeletal:         General: Normal range of motion.      Cervical back: Normal range of motion and neck supple.   Skin:     " General: Skin is warm.   Neurological:      General: No focal deficit present.      Mental Status: He is alert.   Psychiatric:         Mood and Affect: Mood normal.         Lab Results - Last 18 Months   Lab Units 12/16/22  0855 06/16/22  1121 03/14/22  1023   WBC 10*3/mm3 6.50 7.50 5.60   HEMOGLOBIN g/dL 11.2* 11.7* 12.7*   HEMATOCRIT % 34.0* 36.1* 37.7   PLATELETS 10*3/mm3 322 246 254   MCV fL 93.9 97.3* 94.2     Lab Results - Last 18 Months   Lab Units 12/16/22  0855 11/03/22  1853 11/03/22  1158 11/01/22  1553 06/13/22  0941 12/16/21  1056 11/29/21  1115   SODIUM mmol/L 141  --   --   --   --  140 134*   POTASSIUM mmol/L 4.3 4.2 3.7   < >  --  4.0 4.6   CHLORIDE mmol/L 102  --   --   --   --  101 95*   CO2 mmol/L 27.0  --   --   --   --  28.0 26.0   BUN mg/dL 9  --   --   --   --  10 24*   CREATININE mg/dL 1.06  --   --   --  1.10 1.04 1.08   CALCIUM mg/dL 9.5  --   --   --   --  9.3 9.3   BILIRUBIN mg/dL 0.3  --   --   --   --  0.3 0.3   ALK PHOS U/L 113  --   --   --   --  120* 119*   ALT (SGPT) U/L 14  --   --   --   --  19 40   AST (SGOT) U/L 18  --   --   --   --  15 31   GLUCOSE mg/dL 89  --   --   --   --  91 88    < > = values in this interval not displayed.       Lab Results   Component Value Date    GLUCOSE 89 12/16/2022    BUN 9 12/16/2022    CREATININE 1.06 12/16/2022    EGFRIFNONA 73 12/16/2021    BCR 8.5 12/16/2022    K 4.3 12/16/2022    CO2 27.0 12/16/2022    CALCIUM 9.5 12/16/2022    ALBUMIN 3.90 12/16/2022    LABIL2 0.9 (L) 07/08/2021    AST 18 12/16/2022    ALT 14 12/16/2022       Lab Results - Last 18 Months   Lab Units 11/04/22  0358 11/03/22  0400 11/02/22  0400 11/01/22  1917 10/30/22  2102 10/30/22  1201 07/03/21  1841   INR INR  --   --   --  1.2  --  1.1 1.4   APTT s 29.0 28.9 33.4 34.4   < > 34.4 34.5    < > = values in this interval not displayed.       Lab Results   Component Value Date    IRON 63 06/16/2022    TIBC 370 06/16/2022    FERRITIN 114.30 06/16/2022       No results found  for: FOLATE    No results found for: OCCULTBLD    No results found for: RETICCTPCT    No results found for: GLZBVINQ11  No results found for: SPEP, UPEP  Uric Acid   Date Value Ref Range Status   11/25/2019 7.1 2.5 - 8.5 mg/dL Final     No results found for: TAN, RF, SEDRATE  Lab Results   Component Value Date    FIBRINOGEN 576 (H) 07/03/2021     Lab Results   Component Value Date    PTT 29.0 11/04/2022    INR 1.2 11/01/2022     No results found for:   Lab Results   Component Value Date    CEA 3.22 12/16/2022     No components found for: CA-19-9  No results found for: PSA  No results found for: AFPTM, CM8823, HCGTM, SPEP, LARISSA         Assessment & Plan     Patient is a 61-year-old male with T3 N1 M0 rectal adenocarcinoma. He has completed treatment with neoadjuvant chemoradiation with Xeloda s/p LAR and finished adjuvant treatment with XELOX    Rectal adenocarcinoma  T3N1 disease preoperatively  downstaged to ypT2N0 post surgery with no lymph nodes positive  I discussed adjuvant chemotherapy for a total of 4 months with XELOX based on NCCN guidelines, significant benefit even with downstaged after neoadjuvant chemoradiation. Benefit is not very significant in patients with CR however patient had a NH and would still have benefit.   Patient was agreeable and was started on XELOX   C2 with worsening neuropathty affecting quality of life, hand foot disease with scaling, erythema of the hands  Dose reduction of Oxaliplatin to 75 mg/m2 and Xeloda to 1500 mg BID  C3 with worsening neuropathy, Oxaliplatin was stopped and continued single agent Xeloda. Patient has had significant toxicity with xeloda, most concerning is neurotoxicity with confusion which has improved significantly after stopping Xeloda.  With increasing toxicity, treatment was witheld. DPD enzyme assay was normal.  Patient was seen by his surgeon for ileostomy reversal.contineus to have a presacral collection and had a drain tube placed with recent  imaging showing improvement in the area.  Ileostomy reversal complicated with postop anastomotic leak, aspiration pneumonia.  He has a new ileostomy.  Continue see wound care wound has been improving.  July 2021 imaging with no evidence of disease recent CEA 2.6 and normal.  November imaging with no evidence of disease recurrence.  CEA 1.9.    June CT imaging with no recurrence. cea stable at 2.82  Now CEA has increased to 3.2, CT imaging has been ordered for next week.  I will follow-up on the results.  I will see him back in 3 months with a CBC CMP and a CEA with slight increase in his CEA needs to be followed up closely.    Rectal bleeding  With the fistula  Continues follow-up with Dr. Garduno improved.    Central Hypothyroidism  Patient is on low dose prednisone 5 mg.    Leg weakness/Balance problems  Neuropathy is likely contributing to the same  gabapentin 600 mg QID  that has improved now.    Lower back pain  patient has seen a pain clinic, continues to be on oxycodone 10 mg as needed, also has had steroid injection , pain control better. Has had compression fracture,  bisphosphonates with Endocrine continues to be on calcium vitamin D.   Patient wants referral to pain clinic locally.  He is being seen here now.    COPD   echocardiogram with normal findings  likely from COPD  ICS stopped, continues to be on Stiolto   Follows Dr. Sheffield at [facility].    Anemia  S/p 2 doses of iv venofer in summer 2021  Now he has intermittent intermittent rectal bleeding and low hemoglobin  Hemoglobin has been stable      Follow-up in 3 months as above

## 2022-12-22 ENCOUNTER — OFFICE VISIT (OUTPATIENT)
Dept: ONCOLOGY | Facility: CLINIC | Age: 62
End: 2022-12-22

## 2022-12-22 VITALS
HEART RATE: 91 BPM | SYSTOLIC BLOOD PRESSURE: 102 MMHG | BODY MASS INDEX: 29.55 KG/M2 | HEIGHT: 70 IN | OXYGEN SATURATION: 98 % | TEMPERATURE: 96.7 F | DIASTOLIC BLOOD PRESSURE: 68 MMHG | WEIGHT: 206.4 LBS | RESPIRATION RATE: 18 BRPM

## 2022-12-22 DIAGNOSIS — C20 RECTAL CANCER: Primary | ICD-10-CM

## 2022-12-22 PROCEDURE — 99214 OFFICE O/P EST MOD 30 MIN: CPT | Performed by: INTERNAL MEDICINE

## 2022-12-22 RX ORDER — CLOPIDOGREL BISULFATE 75 MG/1
75 TABLET ORAL DAILY
COMMUNITY
Start: 2022-11-30

## 2022-12-22 RX ORDER — NITROGLYCERIN 0.4 MG/1
TABLET SUBLINGUAL
COMMUNITY
Start: 2022-11-07

## 2022-12-22 RX ORDER — ATORVASTATIN CALCIUM 80 MG/1
80 TABLET, FILM COATED ORAL DAILY
COMMUNITY
Start: 2022-12-04

## 2022-12-22 RX ORDER — BUSPIRONE HYDROCHLORIDE 5 MG/1
5 TABLET ORAL 3 TIMES DAILY
COMMUNITY
Start: 2022-12-07

## 2022-12-22 RX ORDER — HYDROXYZINE HYDROCHLORIDE 25 MG/1
TABLET, FILM COATED ORAL
COMMUNITY
Start: 2022-12-05

## 2022-12-22 RX ORDER — FUROSEMIDE 40 MG/1
40 TABLET ORAL 2 TIMES DAILY
COMMUNITY
Start: 2022-12-07

## 2022-12-27 ENCOUNTER — HOSPITAL ENCOUNTER (OUTPATIENT)
Dept: CT IMAGING | Facility: HOSPITAL | Age: 62
Discharge: HOME | End: 2022-12-27
Admitting: INTERNAL MEDICINE

## 2022-12-27 DIAGNOSIS — C20 RECTAL CANCER: ICD-10-CM

## 2022-12-27 PROCEDURE — 71250 CT THORAX DX C-: CPT

## 2022-12-27 PROCEDURE — 74176 CT ABD & PELVIS W/O CONTRAST: CPT

## 2022-12-28 ENCOUNTER — TELEPHONE (OUTPATIENT)
Dept: ONCOLOGY | Facility: CLINIC | Age: 62
End: 2022-12-28

## 2022-12-28 DIAGNOSIS — C20 RECTAL CANCER: Primary | ICD-10-CM

## 2022-12-28 NOTE — PROGRESS NOTES
"Per Dr. Nguyễn pt was called and made aware that Dr. Nguyễn would like him to have a PET scan. Pt v/u. Pt v/u of his scan apt on 1/3. He had no other questions.     Milena contacted  who confirmed that the \"Additional findings and pneumothorax as given above\" is a typo and will be creating an addendum now. Dr. Nguyễn was updated.       "

## 2022-12-28 NOTE — TELEPHONE ENCOUNTER
Caller: [employer]    Relationship:  [employer]    Best call back number: [phone]    What orders are you requesting (i.e. lab or imaging): PET CT FROM SKULL BASE TO MID THIGH    In what timeframe would the patient need to come in:PATIENT SCHEDULED Tuesday 1/3/23 @ 830 AM    Where will you receive your lab/imaging services:[facility]# [phone]

## 2022-12-28 NOTE — TELEPHONE ENCOUNTER
Attempted to call Melissa as this message is not clear on what is needed. She did not answer. V/m was left with call back information.

## 2023-01-03 ENCOUNTER — HOSPITAL ENCOUNTER (OUTPATIENT)
Dept: PET IMAGING | Facility: HOSPITAL | Age: 63
Discharge: HOME | End: 2023-01-03
Payer: MEDICARE

## 2023-01-03 DIAGNOSIS — C20 RECTAL CANCER: ICD-10-CM

## 2023-01-04 ENCOUNTER — HOSPITAL ENCOUNTER (OUTPATIENT)
Dept: PET IMAGING | Facility: HOSPITAL | Age: 63
Discharge: HOME | End: 2023-01-04
Admitting: INTERNAL MEDICINE
Payer: MEDICARE

## 2023-01-04 LAB — GLUCOSE BLDC GLUCOMTR-MCNC: 83 MG/DL (ref 70–105)

## 2023-01-04 PROCEDURE — A9552 F18 FDG: HCPCS | Performed by: INTERNAL MEDICINE

## 2023-01-04 PROCEDURE — 82962 GLUCOSE BLOOD TEST: CPT

## 2023-01-04 PROCEDURE — 0 FLUDEOXYGLUCOSE F18 SOLUTION: Performed by: INTERNAL MEDICINE

## 2023-01-04 PROCEDURE — 78815 PET IMAGE W/CT SKULL-THIGH: CPT

## 2023-01-04 RX ADMIN — FLUDEOXYGLUCOSE F18 1 DOSE: 300 INJECTION INTRAVENOUS at 11:43

## 2023-03-23 ENCOUNTER — CLINICAL SUPPORT (OUTPATIENT)
Dept: FAMILY MEDICINE CLINIC | Facility: CLINIC | Age: 63
End: 2023-03-23
Payer: MEDICARE

## 2023-03-23 DIAGNOSIS — D50.8 OTHER IRON DEFICIENCY ANEMIA: ICD-10-CM

## 2023-03-23 DIAGNOSIS — C20 RECTAL CANCER: ICD-10-CM

## 2023-03-23 LAB
ALBUMIN SERPL-MCNC: 4.4 G/DL (ref 3.5–5.2)
ALBUMIN/GLOB SERPL: 1.4 G/DL
ALP SERPL-CCNC: 141 U/L (ref 39–117)
ALT SERPL W P-5'-P-CCNC: 15 U/L (ref 1–41)
ANION GAP SERPL CALCULATED.3IONS-SCNC: 12 MMOL/L (ref 5–15)
AST SERPL-CCNC: 14 U/L (ref 1–40)
BASOPHILS # BLD AUTO: 0.1 10*3/MM3 (ref 0–0.2)
BASOPHILS NFR BLD AUTO: 0.5 % (ref 0–1.5)
BILIRUB SERPL-MCNC: 0.3 MG/DL (ref 0–1.2)
BUN SERPL-MCNC: 9 MG/DL (ref 8–23)
BUN/CREAT SERPL: 9.4 (ref 7–25)
CALCIUM SPEC-SCNC: 9.8 MG/DL (ref 8.6–10.5)
CHLORIDE SERPL-SCNC: 101 MMOL/L (ref 98–107)
CO2 SERPL-SCNC: 31 MMOL/L (ref 22–29)
CREAT SERPL-MCNC: 0.96 MG/DL (ref 0.76–1.27)
DEPRECATED RDW RBC AUTO: 51.6 FL (ref 37–54)
EGFRCR SERPLBLD CKD-EPI 2021: 89.9 ML/MIN/1.73
EOSINOPHIL # BLD AUTO: 0.1 10*3/MM3 (ref 0–0.4)
EOSINOPHIL NFR BLD AUTO: 0.6 % (ref 0.3–6.2)
ERYTHROCYTE [DISTWIDTH] IN BLOOD BY AUTOMATED COUNT: 16.2 % (ref 12.3–15.4)
GLOBULIN UR ELPH-MCNC: 3.2 GM/DL
GLUCOSE SERPL-MCNC: 101 MG/DL (ref 65–99)
HCT VFR BLD AUTO: 41.4 % (ref 37.5–51)
HGB BLD-MCNC: 12.7 G/DL (ref 13–17.7)
LYMPHOCYTES # BLD AUTO: 1.9 10*3/MM3 (ref 0.7–3.1)
LYMPHOCYTES NFR BLD AUTO: 11.9 % (ref 19.6–45.3)
MCH RBC QN AUTO: 27.6 PG (ref 26.6–33)
MCHC RBC AUTO-ENTMCNC: 30.5 G/DL (ref 31.5–35.7)
MCV RBC AUTO: 90.2 FL (ref 79–97)
MONOCYTES # BLD AUTO: 0.7 10*3/MM3 (ref 0.1–0.9)
MONOCYTES NFR BLD AUTO: 4.6 % (ref 5–12)
NEUTROPHILS NFR BLD AUTO: 13.4 10*3/MM3 (ref 1.7–7)
NEUTROPHILS NFR BLD AUTO: 82.4 % (ref 42.7–76)
NRBC BLD AUTO-RTO: 0 /100 WBC (ref 0–0.2)
PLATELET # BLD AUTO: 500 10*3/MM3 (ref 140–450)
PMV BLD AUTO: 7.9 FL (ref 6–12)
POTASSIUM SERPL-SCNC: 5.2 MMOL/L (ref 3.5–5.2)
PROT SERPL-MCNC: 7.6 G/DL (ref 6–8.5)
RBC # BLD AUTO: 4.59 10*6/MM3 (ref 4.14–5.8)
SODIUM SERPL-SCNC: 144 MMOL/L (ref 136–145)
WBC NRBC COR # BLD: 16.3 10*3/MM3 (ref 3.4–10.8)

## 2023-03-23 PROCEDURE — 85025 COMPLETE CBC W/AUTO DIFF WBC: CPT | Performed by: INTERNAL MEDICINE

## 2023-03-23 PROCEDURE — 36415 COLL VENOUS BLD VENIPUNCTURE: CPT | Performed by: INTERNAL MEDICINE

## 2023-03-23 PROCEDURE — 80053 COMPREHEN METABOLIC PANEL: CPT | Performed by: INTERNAL MEDICINE

## 2023-03-23 PROCEDURE — 82378 CARCINOEMBRYONIC ANTIGEN: CPT | Performed by: INTERNAL MEDICINE

## 2023-03-24 LAB — CEA SERPL-MCNC: 3.46 NG/ML

## 2023-03-28 NOTE — PROGRESS NOTES
HEMATOLOGY ONCOLOGY OUTPATIENT FOLLOW UP      Patient name: Kit Harris  : 1960  MRN: [id number]  Primary Care Physician: Eliu Richards  Referring Physician: Eliu Richards  Reason For Consult:     Chief Complaint   Patient presents with   • Follow-up     Rectal cancer (HCC)       HPI:   History of Present Illness:  Kit Harris is 62 y.o. male who presented to our office on 21 for consultation regarding  Rectal adenocarcinoma status post multimodality treatment.    Patient had a heart attack in 2018. Patient had a stent placed and was started on dual antiplatelt therapy with aspirin and Brillinta. Patient has had minimal blood in stools prior to this. Patient was known to have fissure in the past and he always thought that was the cause of his blood in stools. When the patient started on anticoagulant, patient had worsening of his bleeding. He was then referred to see Dr. Bateman and had an EGD/colonsocopy    2019 - EGD/colon- moderate inactive gastritis. Reflux changes. Adenocarcinoma of the rectum. Large polyp right colon.  Biopsy confirmed adenocarcinoma of the rectum. Polypectomy revealed tubular adenoma.  2019 EUS revealed a large partially obstructing mass in the rectum. With clear invasion of muscularis propria. At least one 4 x 6 mm lymph node was appreciated. Presumptive diagnosis of T3 N1 rectal cancer.  MRI confirms diagnosis , PET with possible liver lesion , negative on MRI  Started on neoadjuvant treatment with XELODA and radiation    19 - C1D1 XelodaRT  10/3/19 - completed radiation treatment.  10/30/19 - Repeat MRI with good response based on imaging, no focal rectal mass lesion seen, decreased size of lymph node  19- Low anterior resection by Dr. Rich in [place]. This was complicated by Abscess, WILL post surgery.   Final path with ypT2N0 staging 0/20 LN positive  20 - C1D1 XELOX  20 C2D1  20 -  C3D1  With intolerance, treatment changed to single agent xeloda and eventually stopped   7/2020 - CT imaging with mildly increased adenopathy in the abdomen  11/2020 - CT imaging with improvement in presacral area, no evidence of disease.  5/2021 - CT CAP interval development of wedge compression fracture. No lytic lesions. No evidence of metastatic disease.  6/2021 -patient was admitted to [facility] for reversal of ostomy.  He had complications after the procedure with anastomotic leak requiring emergency surgery and repeat ileostomy.  He had aspiration pneumonia was on the ventilator and eventually recovered.  He is discharged has a wound VAC in place.  July 11, 2021 -CT chest abdomen pelvis with contrast showing no evidence of recurrence of disease.  August 2021 -CEA 2.6.  November 2021 -CT chest abdomen pelvis with stable postoperative changes, stable compression fractures, stable fracture of the sacrum.  No evidence of new or recurrent disease.  December 2021 -CEA 1.9.  June 2022 - CT imaging with continued fistula, no evidence of recurrent malignancy.  December 2022 -patient admitted to [facility] with NSTEMI needed PCI started on dual antiplatelet therapy  12/16/2022 CEA slightly increased to 3.2  1/3/2023 - PET CT with some uptake in the anorectal junction  1/31/2023 - sigmoidoscopy with biopsy showing single erosion in the distal rectum  3/23/2023 - CEA increased to 3.46      Subjective:  Patient is having intermittent diarrhea. Resolving now.      Past Medical History:   Diagnosis Date   • Anemia    • Back pain    • Constipation    • COPD (chronic obstructive pulmonary disease) (HCC)    • Extremity pain     Left leg   • History of heart attack    • HTN (hypertension)    • Hyperlipidemia    • Ileostomy, has currently (HCC)    • Low back pain    • Low serum cortisol level    • ROCIO (obstructive sleep apnea)    • Osteoporosis    • Rectal cancer (HCC)     Colorectal surgery       Past Surgical  History:   Procedure Laterality Date   • BACK SURGERY     • CHOLECYSTECTOMY     • CORONARY ANGIOPLASTY     • HERNIA REPAIR     • ILEOSTOMY     • RECTAL SURGERY           Current Outpatient Medications:   •  Acetaminophen 325 MG capsule, , Disp: , Rfl:   •  albuterol sulfate  (90 Base) MCG/ACT inhaler, Inhale 2 puffs 4 (Four) Times a Day As Needed., Disp: , Rfl:   •  aspirin 81 MG EC tablet, Take 1 tablet by mouth., Disp: , Rfl:   •  atorvastatin (LIPITOR) 80 MG tablet, Take 1 tablet by mouth Daily., Disp: , Rfl:   •  buPROPion SR (WELLBUTRIN SR) 100 MG 12 hr tablet, Take 1 tablet by mouth 2 (Two) Times a Day., Disp: , Rfl:   •  busPIRone (BUSPAR) 5 MG tablet, Take 1 tablet by mouth 3 (Three) Times a Day., Disp: , Rfl:   •  Calcium Carb-Cholecalciferol (Calcium 1000 + D) 1000-800 MG-UNIT tablet, Take 1,200 mg by mouth Daily., Disp: , Rfl:   •  calcium carbonate-vitamin d 600-400 MG-UNIT per tablet, Take  by mouth., Disp: , Rfl:   •  chlorproMAZINE (THORAZINE) 25 MG tablet, Take 1 tablet by mouth 3 (Three) Times a Day., Disp: , Rfl:   •  clopidogrel (PLAVIX) 75 MG tablet, Take 1 tablet by mouth Daily., Disp: , Rfl:   •  diclofenac (VOLTAREN) 50 MG EC tablet, Take 1 tablet by mouth 2 (Two) Times a Day., Disp: , Rfl:   •  DULoxetine (CYMBALTA) 60 MG capsule, , Disp: , Rfl:   •  Entresto 24-26 MG tablet, , Disp: , Rfl:   •  famotidine (PEPCID) 40 MG tablet, Take 1 tablet by mouth every night at bedtime., Disp: , Rfl:   •  Fluticasone-Umeclidin-Vilant (TRELEGY) 200-62.5-25 MCG/INH inhaler, Inhale 1 puff., Disp: , Rfl:   •  furosemide (LASIX) 40 MG tablet, Take 1 tablet by mouth 2 (Two) Times a Day., Disp: , Rfl:   •  gabapentin (NEURONTIN) 600 MG tablet, , Disp: , Rfl:   •  hydrOXYzine (ATARAX) 25 MG tablet, TAKE 1 TABLET BY MOUTH 3 TIMES DAILY AS NEEDED FOR ITCHING., Disp: , Rfl:   •  levothyroxine (SYNTHROID, LEVOTHROID) 50 MCG tablet, , Disp: , Rfl:   •  metoprolol succinate XL (TOPROL-XL) 25 MG 24 hr tablet,  "TAKE 1 TABLET BY MOUTH 1 TIME EACH DAY. DO NOT CRUSH OR CHEW., Disp: , Rfl:   •  nitroglycerin (NITROSTAT) 0.4 MG SL tablet, PLACE 1 TABLET UNDER THE TONGUE EVERY 5 MINUTES AS NEEDED FOR CHEST PAIN., Disp: , Rfl:   •  oxyCODONE (ROXICODONE) 10 MG tablet, 1.5 tablets., Disp: , Rfl:   •  predniSONE (DELTASONE) 5 MG tablet, Take 1 tablet by mouth Daily., Disp: , Rfl:   •  Trelegy Ellipta 200-62.5-25 MCG/INH inhaler, INHALE 1 PUFF 1 (ONE) TIME EACH DAY., Disp: , Rfl:     Allergies   Allergen Reactions   • Adhesive Tape Unknown - Low Severity     Other reaction(s): Multiple skin tears   • Tape Irritability       Family History   Problem Relation Age of Onset   • Leukemia Mother    • Heart disease Father    • Lung disease Father    • Vaginal cancer Sister    • Diabetes Brother    • Hypertension Brother        Cancer-related family history includes Vaginal cancer in his sister.    Social History     Tobacco Use   • Smoking status: Former   • Smokeless tobacco: Never   • Tobacco comments:     Quit 06/2021   Vaping Use   • Vaping Use: Every day   • Substances: Nicotine   Substance Use Topics   • Alcohol use: Not Currently   • Drug use: Never     Social History     Social History Narrative   • Not on file      Objective:    Vitals:    03/30/23 1030   BP: 112/78   Pulse: 66   Resp: 16   Temp: 98.2 °F (36.8 °C)   SpO2: 98%   Weight: 87.5 kg (192 lb 12.8 oz)   Height: 177.8 cm (70\")   PainSc: 0-No pain     Body mass index is 27.66 kg/m².  ECOG  (1) Restricted in physically strenuous activity, ambulatory and able to do work of light nature    Physical Exam:     Physical Exam  Constitutional:       Appearance: Normal appearance.   HENT:      Head: Normocephalic and atraumatic.      Mouth/Throat:      Mouth: Mucous membranes are moist.   Eyes:      Extraocular Movements: Extraocular movements intact.      Pupils: Pupils are equal, round, and reactive to light.   Cardiovascular:      Rate and Rhythm: Normal rate and regular rhythm. "      Pulses: Normal pulses.      Heart sounds: No murmur heard.  Pulmonary:      Effort: Pulmonary effort is normal.      Breath sounds: Normal breath sounds.   Abdominal:      General: There is no distension.      Palpations: Abdomen is soft. There is no mass.      Tenderness: There is no abdominal tenderness.   Musculoskeletal:         General: Normal range of motion.      Cervical back: Normal range of motion and neck supple.   Skin:     General: Skin is warm.   Neurological:      General: No focal deficit present.      Mental Status: He is alert.   Psychiatric:         Mood and Affect: Mood normal.         Lab Results - Last 18 Months   Lab Units 03/23/23  1007 12/16/22  0855 06/16/22  1121   WBC 10*3/mm3 16.30* 6.50 7.50   HEMOGLOBIN g/dL 12.7* 11.2* 11.7*   HEMATOCRIT % 41.4 34.0* 36.1*   PLATELETS 10*3/mm3 500* 322 246   MCV fL 90.2 93.9 97.3*     Lab Results - Last 18 Months   Lab Units 03/23/23  1007 12/16/22  0855 11/03/22  1853 11/01/22  1553 06/13/22  0941 12/16/21  1056   SODIUM mmol/L 144 141  --   --   --  140   POTASSIUM mmol/L 5.2 4.3 4.2   < >  --  4.0   CHLORIDE mmol/L 101 102  --   --   --  101   CO2 mmol/L 31.0* 27.0  --   --   --  28.0   BUN mg/dL 9 9  --   --   --  10   CREATININE mg/dL 0.96 1.06  --   --  1.10 1.04   CALCIUM mg/dL 9.8 9.5  --   --   --  9.3   BILIRUBIN mg/dL 0.3 0.3  --   --   --  0.3   ALK PHOS U/L 141* 113  --   --   --  120*   ALT (SGPT) U/L 15 14  --   --   --  19   AST (SGOT) U/L 14 18  --   --   --  15   GLUCOSE mg/dL 101* 89  --   --   --  91    < > = values in this interval not displayed.       Lab Results   Component Value Date    GLUCOSE 101 (H) 03/23/2023    BUN 9 03/23/2023    CREATININE 0.96 03/23/2023    EGFRIFNONA 73 12/16/2021    BCR 9.4 03/23/2023    K 5.2 03/23/2023    CO2 31.0 (H) 03/23/2023    CALCIUM 9.8 03/23/2023    ALBUMIN 4.4 03/23/2023    LABIL2 0.9 (L) 07/08/2021    AST 14 03/23/2023    ALT 15 03/23/2023       Lab Results - Last 18 Months   Lab  Units 11/04/22  0358 11/03/22  0400 11/02/22  0400 11/01/22  1917 10/30/22  2102 10/30/22  1201   INR INR  --   --   --  1.2  --  1.1   APTT s 29.0 28.9 33.4 34.4   < > 34.4    < > = values in this interval not displayed.       Lab Results   Component Value Date    IRON 63 06/16/2022    TIBC 370 06/16/2022    FERRITIN 114.30 06/16/2022       No results found for: FOLATE    No results found for: OCCULTBLD    No results found for: RETICCTPCT    No results found for: QYHSZURK44  No results found for: SPEP, UPEP  Uric Acid   Date Value Ref Range Status   11/25/2019 7.1 2.5 - 8.5 mg/dL Final     No results found for: TAN, RF, SEDRATE  Lab Results   Component Value Date    FIBRINOGEN 576 (H) 07/03/2021     Lab Results   Component Value Date    PTT 29.0 11/04/2022    INR 1.2 11/01/2022     No results found for:   Lab Results   Component Value Date    CEA 3.46 03/23/2023     No components found for: CA-19-9  No results found for: PSA  No results found for: AFPTM, BW5326, HCGTM, SPEP, LARISSA         Assessment & Plan     Patient is a 61-year-old male with T3 N1 M0 rectal adenocarcinoma. He has completed treatment with neoadjuvant chemoradiation with Xeloda s/p LAR and finished adjuvant treatment with XELOX    Rectal adenocarcinoma  T3N1 disease preoperatively  downstaged to ypT2N0 post surgery with no lymph nodes positive  I discussed adjuvant chemotherapy for a total of 4 months with XELOX based on NCCN guidelines, significant benefit even with downstaged after neoadjuvant chemoradiation. Benefit is not very significant in patients with CR however patient had a OR and would still have benefit.   Patient was agreeable and was started on XELOX   C2 with worsening neuropathty affecting quality of life, hand foot disease with scaling, erythema of the hands  Dose reduction of Oxaliplatin to 75 mg/m2 and Xeloda to 1500 mg BID  C3 with worsening neuropathy, Oxaliplatin was stopped and continued single agent Xeloda. Patient has  had significant toxicity with xeloda, most concerning is neurotoxicity with confusion which has improved significantly after stopping Xeloda.  With increasing toxicity, treatment was witheld. DPD enzyme assay was normal.  Patient was seen by his surgeon for ileostomy reversal.contineus to have a presacral collection and had a drain tube placed with recent imaging showing improvement in the area.  Ileostomy reversal complicated with postop anastomotic leak, aspiration pneumonia.  He has a new ileostomy.  Continue see wound care wound has been improving.  July 2021 imaging with no evidence of disease recent CEA 2.6 and normal.  November imaging with no evidence of disease recurrence.  CEA 1.9.    June CT imaging with no recurrence. cea stable at 2.82  January 2023 PET CT with increased uptake in the anorectal area. Subsequent sigmoidoscopy and biopsy no concerning findings except erosion.  Now CEA increased to 3.46 repeat in 6 weeks and follow up. If continued increase, will repeat CT imaging    Rectal bleeding  With the fistula sigmoidoscopy showed erosion likely the cause of bleeding.    Central Hypothyroidism  Patient is on low dose prednisone 5 mg.    Leg weakness/Balance problems  Neuropathy is likely contributing to the same  gabapentin 600 mg QID  that has improved now.    Lower back pain  patient has seen a pain clinic, continues to be on oxycodone 10 mg as needed, also has had steroid injection , pain control better. Has had compression fracture,  bisphosphonates with Endocrine continues to be on calcium vitamin D.   Patient wants referral to pain clinic locally.  He is being seen here now.    COPD   echocardiogram with normal findings  likely from COPD  ICS stopped, continues to be on Stiolto   Follows Dr. Sheffield at [facility].    Anemia  S/p 2 doses of iv venofer in summer 2021  Now he has intermittent intermittent rectal bleeding and low hemoglobin  Hemoglobin has been stable      Follow-up in 6 weeks as above.

## 2023-03-30 ENCOUNTER — OFFICE VISIT (OUTPATIENT)
Dept: ONCOLOGY | Facility: CLINIC | Age: 63
End: 2023-03-30
Payer: MEDICARE

## 2023-03-30 VITALS
BODY MASS INDEX: 27.6 KG/M2 | WEIGHT: 192.8 LBS | TEMPERATURE: 98.2 F | RESPIRATION RATE: 16 BRPM | HEART RATE: 66 BPM | SYSTOLIC BLOOD PRESSURE: 112 MMHG | OXYGEN SATURATION: 98 % | HEIGHT: 70 IN | DIASTOLIC BLOOD PRESSURE: 78 MMHG

## 2023-03-30 DIAGNOSIS — C20 RECTAL CANCER: Primary | ICD-10-CM

## 2023-03-30 PROCEDURE — 99214 OFFICE O/P EST MOD 30 MIN: CPT | Performed by: INTERNAL MEDICINE

## 2023-03-30 PROCEDURE — 1160F RVW MEDS BY RX/DR IN RCRD: CPT | Performed by: INTERNAL MEDICINE

## 2023-03-30 PROCEDURE — 1126F AMNT PAIN NOTED NONE PRSNT: CPT | Performed by: INTERNAL MEDICINE

## 2023-03-30 PROCEDURE — 1159F MED LIST DOCD IN RCRD: CPT | Performed by: INTERNAL MEDICINE

## 2023-03-30 RX ORDER — METOPROLOL SUCCINATE 25 MG/1
TABLET, EXTENDED RELEASE ORAL
COMMUNITY
Start: 2023-02-23

## 2023-03-30 RX ORDER — SACUBITRIL AND VALSARTAN 24; 26 MG/1; MG/1
TABLET, FILM COATED ORAL
COMMUNITY
Start: 2023-03-22

## 2023-05-04 ENCOUNTER — CLINICAL SUPPORT (OUTPATIENT)
Dept: FAMILY MEDICINE CLINIC | Facility: CLINIC | Age: 63
End: 2023-05-04
Payer: MEDICARE

## 2023-05-04 DIAGNOSIS — C20 RECTAL CANCER: ICD-10-CM

## 2023-05-04 DIAGNOSIS — D50.8 OTHER IRON DEFICIENCY ANEMIA: ICD-10-CM

## 2023-05-04 LAB
BASOPHILS # BLD AUTO: 0.07 10*3/MM3 (ref 0–0.2)
BASOPHILS NFR BLD AUTO: 0.7 % (ref 0–1.5)
DEPRECATED RDW RBC AUTO: 52.2 FL (ref 37–54)
EOSINOPHIL # BLD AUTO: 0.18 10*3/MM3 (ref 0–0.4)
EOSINOPHIL NFR BLD AUTO: 1.9 % (ref 0.3–6.2)
ERYTHROCYTE [DISTWIDTH] IN BLOOD BY AUTOMATED COUNT: 16.2 % (ref 12.3–15.4)
HCT VFR BLD AUTO: 36.2 % (ref 37.5–51)
HGB BLD-MCNC: 11.7 G/DL (ref 13–17.7)
IMM GRANULOCYTES # BLD AUTO: 0.06 10*3/MM3 (ref 0–0.05)
IMM GRANULOCYTES NFR BLD AUTO: 0.6 % (ref 0–0.5)
LYMPHOCYTES # BLD AUTO: 0.85 10*3/MM3 (ref 0.7–3.1)
LYMPHOCYTES NFR BLD AUTO: 8.8 % (ref 19.6–45.3)
MCH RBC QN AUTO: 28.8 PG (ref 26.6–33)
MCHC RBC AUTO-ENTMCNC: 32.3 G/DL (ref 31.5–35.7)
MCV RBC AUTO: 89.2 FL (ref 79–97)
MONOCYTES # BLD AUTO: 0.48 10*3/MM3 (ref 0.1–0.9)
MONOCYTES NFR BLD AUTO: 5 % (ref 5–12)
NEUTROPHILS NFR BLD AUTO: 7.97 10*3/MM3 (ref 1.7–7)
NEUTROPHILS NFR BLD AUTO: 83 % (ref 42.7–76)
NRBC BLD AUTO-RTO: 0 /100 WBC (ref 0–0.2)
PLATELET # BLD AUTO: 335 10*3/MM3 (ref 140–450)
PMV BLD AUTO: 9.6 FL (ref 6–12)
RBC # BLD AUTO: 4.06 10*6/MM3 (ref 4.14–5.8)
WBC NRBC COR # BLD: 9.61 10*3/MM3 (ref 3.4–10.8)

## 2023-05-04 PROCEDURE — 36415 COLL VENOUS BLD VENIPUNCTURE: CPT | Performed by: INTERNAL MEDICINE

## 2023-05-04 PROCEDURE — 85025 COMPLETE CBC W/AUTO DIFF WBC: CPT | Performed by: INTERNAL MEDICINE

## 2023-05-04 PROCEDURE — 80053 COMPREHEN METABOLIC PANEL: CPT | Performed by: INTERNAL MEDICINE

## 2023-05-04 PROCEDURE — 82378 CARCINOEMBRYONIC ANTIGEN: CPT | Performed by: INTERNAL MEDICINE

## 2023-05-04 NOTE — PROGRESS NOTES
Venipuncture Blood Specimen Collection  Venipuncture performed in [facility] by Diamond Morales MA with good hemostasis. Patient tolerated the procedure well without complications.   05/04/23   Diamond Morales MA

## 2023-05-05 LAB
ALBUMIN SERPL-MCNC: 4.3 G/DL (ref 3.5–5.2)
ALBUMIN/GLOB SERPL: 1.6 G/DL
ALP SERPL-CCNC: 103 U/L (ref 39–117)
ALT SERPL W P-5'-P-CCNC: 9 U/L (ref 1–41)
ANION GAP SERPL CALCULATED.3IONS-SCNC: 13.1 MMOL/L (ref 5–15)
AST SERPL-CCNC: 13 U/L (ref 1–40)
BILIRUB SERPL-MCNC: 0.3 MG/DL (ref 0–1.2)
BUN SERPL-MCNC: 9 MG/DL (ref 8–23)
BUN/CREAT SERPL: 9.4 (ref 7–25)
CALCIUM SPEC-SCNC: 9.5 MG/DL (ref 8.6–10.5)
CEA SERPL-MCNC: 3.5 NG/ML
CHLORIDE SERPL-SCNC: 103 MMOL/L (ref 98–107)
CO2 SERPL-SCNC: 24.9 MMOL/L (ref 22–29)
CREAT SERPL-MCNC: 0.96 MG/DL (ref 0.76–1.27)
EGFRCR SERPLBLD CKD-EPI 2021: 89.4 ML/MIN/1.73
GLOBULIN UR ELPH-MCNC: 2.7 GM/DL
GLUCOSE SERPL-MCNC: 60 MG/DL (ref 65–99)
POTASSIUM SERPL-SCNC: 4.2 MMOL/L (ref 3.5–5.2)
PROT SERPL-MCNC: 7 G/DL (ref 6–8.5)
SODIUM SERPL-SCNC: 141 MMOL/L (ref 136–145)

## 2023-10-19 ENCOUNTER — TELEPHONE (OUTPATIENT)
Dept: ONCOLOGY | Facility: CLINIC | Age: 63
End: 2023-10-19

## 2023-10-19 NOTE — TELEPHONE ENCOUNTER
"Caller: Kit Harris \"BILL\"    Relationship to patient: Self    Best call back number: [phone]    Patient is needing: TO SCHEDULE F/U APPT WITH DR. MAHER BEFORE THE END OF THE YEAR. HE WANTS TO GET IN BEFORE THE CONTRACT WITH MEDICARE BEFORE THE END OF THE YEAR.  "

## 2023-11-03 ENCOUNTER — TELEPHONE (OUTPATIENT)
Dept: ONCOLOGY | Facility: CLINIC | Age: 63
End: 2023-11-03
Payer: MEDICARE

## 2023-11-08 NOTE — PROGRESS NOTES
HEMATOLOGY ONCOLOGY OUTPATIENT FOLLOW UP      Patient name: Kit Harris  : 1960  MRN: [id number]  Primary Care Physician: Eliu Richards  Referring Physician: Eliu Richards  Reason For Consult:     Chief Complaint   Patient presents with    Follow-up     Rectal cancer       HPI:   History of Present Illness:  Kit Harris is 63 y.o. male who presented to our office on 21 for consultation regarding  Rectal adenocarcinoma status post multimodality treatment.    Patient had a heart attack in 2018. Patient had a stent placed and was started on dual antiplatelt therapy with aspirin and Brillinta. Patient has had minimal blood in stools prior to this. Patient was known to have fissure in the past and he always thought that was the cause of his blood in stools. When the patient started on anticoagulant, patient had worsening of his bleeding. He was then referred to see Dr. Bateman and had an EGD/colonsocopy    2019 - EGD/colon- moderate inactive gastritis. Reflux changes. Adenocarcinoma of the rectum. Large polyp right colon.  Biopsy confirmed adenocarcinoma of the rectum. Polypectomy revealed tubular adenoma.  2019 EUS revealed a large partially obstructing mass in the rectum. With clear invasion of muscularis propria. At least one 4 x 6 mm lymph node was appreciated. Presumptive diagnosis of T3 N1 rectal cancer.  MRI confirms diagnosis , PET with possible liver lesion , negative on MRI  Started on neoadjuvant treatment with XELODA and radiation    19 - C1D1 XelodaRT  10/3/19 - completed radiation treatment.  10/30/19 - Repeat MRI with good response based on imaging, no focal rectal mass lesion seen, decreased size of lymph node  19- Low anterior resection by Dr. Rich in [place]. This was complicated by Abscess, WILL post surgery.   Final path with ypT2N0 staging 0/20 LN positive  20 - C1D1 XELOX  20 C2D1  20 - C3D1  With  intolerance, treatment changed to single agent xeloda and eventually stopped   7/2020 - CT imaging with mildly increased adenopathy in the abdomen  11/2020 - CT imaging with improvement in presacral area, no evidence of disease.  5/2021 - CT CAP interval development of wedge compression fracture. No lytic lesions. No evidence of metastatic disease.  6/2021 -patient was admitted to [facility] for reversal of ostomy.  He had complications after the procedure with anastomotic leak requiring emergency surgery and repeat ileostomy.  He had aspiration pneumonia was on the ventilator and eventually recovered.  He is discharged has a wound VAC in place.  July 11, 2021 -CT chest abdomen pelvis with contrast showing no evidence of recurrence of disease.  August 2021 -CEA 2.6.  November 2021 -CT chest abdomen pelvis with stable postoperative changes, stable compression fractures, stable fracture of the sacrum.  No evidence of new or recurrent disease.  December 2021 -CEA 1.9.  June 2022 - CT imaging with continued fistula, no evidence of recurrent malignancy.  December 2022 -patient admitted to [facility] with NSTEMI needed PCI started on dual antiplatelet therapy  12/16/2022 CEA slightly increased to 3.2  1/3/2023 - PET CT with some uptake in the anorectal junction  1/31/2023 - sigmoidoscopy with biopsy showing single erosion in the distal rectum  3/23/2023 - CEA increased to 3.46  5/2023 - hb 11.7, cea 3.50    Subjective:  Patient denies any new symptoms.       Past Medical History:   Diagnosis Date    Anemia     Back pain     Constipation     COPD (chronic obstructive pulmonary disease)     Extremity pain     Left leg    History of heart attack     HTN (hypertension)     Hyperlipidemia     Ileostomy, has currently     Low back pain     Low serum cortisol level     ROCIO (obstructive sleep apnea)     Osteoporosis     Rectal cancer     Colorectal surgery       Past Surgical History:   Procedure Laterality Date    BACK  SURGERY      CHOLECYSTECTOMY      CORONARY ANGIOPLASTY      HERNIA REPAIR      ILEOSTOMY      RECTAL SURGERY           Current Outpatient Medications:     Acetaminophen 325 MG capsule, , Disp: , Rfl:     albuterol sulfate  (90 Base) MCG/ACT inhaler, Inhale 2 puffs 4 (Four) Times a Day As Needed., Disp: , Rfl:     aspirin 81 MG EC tablet, Take 1 tablet by mouth., Disp: , Rfl:     atorvastatin (LIPITOR) 80 MG tablet, Take 1 tablet by mouth Daily., Disp: , Rfl:     buPROPion SR (WELLBUTRIN SR) 100 MG 12 hr tablet, Take 1 tablet by mouth 2 (Two) Times a Day., Disp: , Rfl:     busPIRone (BUSPAR) 5 MG tablet, Take 1 tablet by mouth 3 (Three) Times a Day., Disp: , Rfl:     Calcium Carb-Cholecalciferol (Calcium 1000 + D) 1000-800 MG-UNIT tablet, Take 1,200 mg by mouth Daily., Disp: , Rfl:     calcium carbonate-vitamin d 600-400 MG-UNIT per tablet, Take  by mouth., Disp: , Rfl:     chlorproMAZINE (THORAZINE) 25 MG tablet, Take 1 tablet by mouth 3 (Three) Times a Day., Disp: , Rfl:     clopidogrel (PLAVIX) 75 MG tablet, Take 1 tablet by mouth Daily., Disp: , Rfl:     diclofenac (VOLTAREN) 50 MG EC tablet, Take 1 tablet by mouth 2 (Two) Times a Day., Disp: , Rfl:     DULoxetine (CYMBALTA) 60 MG capsule, , Disp: , Rfl:     Entresto 24-26 MG tablet, , Disp: , Rfl:     famotidine (PEPCID) 40 MG tablet, Take 1 tablet by mouth every night at bedtime., Disp: , Rfl:     Fluticasone-Umeclidin-Vilant (TRELEGY) 200-62.5-25 MCG/INH inhaler, Inhale 1 puff., Disp: , Rfl:     furosemide (LASIX) 40 MG tablet, Take 1 tablet by mouth 2 (Two) Times a Day., Disp: , Rfl:     gabapentin (NEURONTIN) 600 MG tablet, , Disp: , Rfl:     hydrOXYzine (ATARAX) 25 MG tablet, TAKE 1 TABLET BY MOUTH 3 TIMES DAILY AS NEEDED FOR ITCHING., Disp: , Rfl:     levothyroxine (SYNTHROID, LEVOTHROID) 50 MCG tablet, , Disp: , Rfl:     metoprolol succinate XL (TOPROL-XL) 25 MG 24 hr tablet, TAKE 1 TABLET BY MOUTH 1 TIME EACH DAY. DO NOT CRUSH OR CHEW., Disp: ,  "Rfl:     nitroglycerin (NITROSTAT) 0.4 MG SL tablet, PLACE 1 TABLET UNDER THE TONGUE EVERY 5 MINUTES AS NEEDED FOR CHEST PAIN., Disp: , Rfl:     oxyCODONE (ROXICODONE) 10 MG tablet, 1.5 tablets., Disp: , Rfl:     predniSONE (DELTASONE) 5 MG tablet, Take 1 tablet by mouth Daily., Disp: , Rfl:     Trelegy Ellipta 200-62.5-25 MCG/INH inhaler, INHALE 1 PUFF 1 (ONE) TIME EACH DAY., Disp: , Rfl:     Allergies   Allergen Reactions    Adhesive Tape Unknown - Low Severity     Other reaction(s): Multiple skin tears    Tape Irritability       Family History   Problem Relation Age of Onset    Leukemia Mother     Heart disease Father     Lung disease Father     Vaginal cancer Sister     Diabetes Brother     Hypertension Brother        Cancer-related family history includes Vaginal cancer in his sister.    Social History     Tobacco Use    Smoking status: Former    Smokeless tobacco: Never    Tobacco comments:     Quit 06/2021   Vaping Use    Vaping Use: Every day    Substances: Nicotine   Substance Use Topics    Alcohol use: Not Currently    Drug use: Never     Social History     Social History Narrative    Not on file      Objective:    Vitals:    11/09/23 0949   BP: 117/75   Pulse: 85   Resp: 16   Temp: 98.2 °F (36.8 °C)   SpO2: 97%   Weight: 96.3 kg (212 lb 6.4 oz)   Height: 177.8 cm (70\")   PainSc: 0-No pain       Body mass index is 30.48 kg/m².  ECOG  (1) Restricted in physically strenuous activity, ambulatory and able to do work of light nature    Physical Exam:     Physical Exam  Constitutional:       Appearance: Normal appearance.   HENT:      Head: Normocephalic and atraumatic.      Mouth/Throat:      Mouth: Mucous membranes are moist.   Eyes:      Extraocular Movements: Extraocular movements intact.      Pupils: Pupils are equal, round, and reactive to light.   Cardiovascular:      Rate and Rhythm: Normal rate and regular rhythm.      Pulses: Normal pulses.      Heart sounds: No murmur heard.  Pulmonary:      Effort: " Pulmonary effort is normal.      Breath sounds: Normal breath sounds.   Abdominal:      General: There is no distension.      Palpations: Abdomen is soft. There is no mass.      Tenderness: There is no abdominal tenderness.   Musculoskeletal:         General: Normal range of motion.      Cervical back: Normal range of motion and neck supple.   Skin:     General: Skin is warm.   Neurological:      General: No focal deficit present.      Mental Status: He is alert.   Psychiatric:         Mood and Affect: Mood normal.         Lab Results - Last 18 Months   Lab Units 05/04/23  1027 03/23/23  1007 12/16/22  0855   WBC 10*3/mm3 9.61 16.30* 6.50   HEMOGLOBIN g/dL 11.7* 12.7* 11.2*   HEMATOCRIT % 36.2* 41.4 34.0*   PLATELETS 10*3/mm3 335 500* 322   MCV fL 89.2 90.2 93.9     Lab Results - Last 18 Months   Lab Units 05/04/23  1027 03/23/23  1007 12/16/22  0855   SODIUM mmol/L 141 144 141   POTASSIUM mmol/L 4.2 5.2 4.3   CHLORIDE mmol/L 103 101 102   CO2 mmol/L 24.9 31.0* 27.0   BUN mg/dL 9 9 9   CREATININE mg/dL 0.96 0.96 1.06   CALCIUM mg/dL 9.5 9.8 9.5   BILIRUBIN mg/dL 0.3 0.3 0.3   ALK PHOS U/L 103 141* 113   ALT (SGPT) U/L 9 15 14   AST (SGOT) U/L 13 14 18   GLUCOSE mg/dL 60* 101* 89       Lab Results   Component Value Date    GLUCOSE 60 (L) 05/04/2023    BUN 9 05/04/2023    CREATININE 0.96 05/04/2023    EGFRIFNONA 73 12/16/2021    BCR 9.4 05/04/2023    K 4.2 05/04/2023    CO2 24.9 05/04/2023    CALCIUM 9.5 05/04/2023    ALBUMIN 4.3 05/04/2023    LABIL2 0.9 (L) 07/08/2021    AST 13 05/04/2023    ALT 9 05/04/2023       Lab Results - Last 18 Months   Lab Units 11/04/22  0358 11/03/22  0400 11/02/22  0400 11/01/22  1917 10/30/22  2102 10/30/22  1201   INR INR  --   --   --  1.2  --  1.1   APTT s 29.0 28.9 33.4 34.4   < > 34.4    < > = values in this interval not displayed.       Lab Results   Component Value Date    IRON 63 06/16/2022    TIBC 370 06/16/2022    FERRITIN 114.30 06/16/2022       No results found for:  "\"FOLATE\"    No results found for: \"OCCULTBLD\"    No results found for: \"RETICCTPCT\"    No results found for: \"NMMNSRWY55\"  No results found for: \"SPEP\", \"UPEP\"  Uric Acid   Date Value Ref Range Status   11/25/2019 7.1 2.5 - 8.5 mg/dL Final     No results found for: \"TAN\", \"RF\", \"SEDRATE\"  Lab Results   Component Value Date    FIBRINOGEN 576 (H) 07/03/2021     Lab Results   Component Value Date    PTT 29.0 11/04/2022    INR 1.2 11/01/2022     No results found for: \"\"  Lab Results   Component Value Date    CEA 3.50 05/04/2023     No components found for: \"CA-19-9\"  No results found for: \"PSA\"  No results found for: \"AFPTM\", \"MT1845\", \"HCGTM\", \"SPEP\", \"LARISSA\"         Assessment & Plan     Patient is a 61-year-old male with T3 N1 M0 rectal adenocarcinoma. He has completed treatment with neoadjuvant chemoradiation with Xeloda s/p LAR and finished adjuvant treatment with XELOX    Rectal adenocarcinoma  T3N1 disease preoperatively  downstaged to ypT2N0 post surgery with no lymph nodes positive  I discussed adjuvant chemotherapy for a total of 4 months with XELOX based on NCCN guidelines, significant benefit even with downstaged after neoadjuvant chemoradiation. Benefit is not very significant in patients with CR however patient had a IA and would still have benefit.   Patient was agreeable and was started on XELOX   C2 with worsening neuropathty affecting quality of life, hand foot disease with scaling, erythema of the hands  Dose reduction of Oxaliplatin to 75 mg/m2 and Xeloda to 1500 mg BID  C3 with worsening neuropathy, Oxaliplatin was stopped and continued single agent Xeloda. Patient has had significant toxicity with xeloda, most concerning is neurotoxicity with confusion which has improved significantly after stopping Xeloda.  With increasing toxicity, treatment was witheld. DPD enzyme assay was normal.  Patient was seen by his surgeon for ileostomy reversal.contineus to have a presacral collection and had a drain " tube placed with recent imaging showing improvement in the area.  Ileostomy reversal complicated with postop anastomotic leak, aspiration pneumonia.  He has a new ileostomy.  Continue see wound care wound has been improving.  July 2021 imaging with no evidence of disease recent CEA 2.6 and normal.  November imaging with no evidence of disease recurrence.  CEA 1.9.    June CT imaging with no recurrence. cea stable at 2.82  January 2023 PET CT with increased uptake in the anorectal area. Subsequent sigmoidoscopy and biopsy no concerning findings except erosion.  Now CEA increased to 3.5 with last check recheck cea now   With his insurance issues with [facility], he would like to switch providers. Will refer him to [facility].  Will order for CEA cbc cmp now, he will need CT imaging in 1/2024 , unless his CEA is elevated significantly.    Rectal bleeding  With the fistula sigmoidoscopy showed erosion likely the cause of bleeding.  This is still intermittent.    Central Hypothyroidism  Patient is on low dose prednisone 5 mg.    Leg weakness/Balance problems  Neuropathy is likely contributing to the same  gabapentin 600 mg QID  that has improved now. Continue the same    Lower back pain  patient has seen a pain clinic, continues to be on oxycodone 10 mg as needed, also has had steroid injection , pain control better. Has had compression fracture,  bisphosphonates with Endocrine continues to be on calcium vitamin D.   Follows with pain clinic locally    COPD   echocardiogram with normal findings  likely from COPD  ICS stopped, continues to be on Stiolto   Follows with [facility]    Anemia  S/p 2 doses of iv venofer in summer 2021  Now he has intermittent intermittent rectal bleeding and low hemoglobin  Hemoglobin has been stable      Will order labs now, will check cea if elevated scans now otherwise in 1/2024

## 2023-11-09 ENCOUNTER — OFFICE VISIT (OUTPATIENT)
Dept: ONCOLOGY | Facility: CLINIC | Age: 63
End: 2023-11-09
Payer: MEDICARE

## 2023-11-09 VITALS
RESPIRATION RATE: 16 BRPM | DIASTOLIC BLOOD PRESSURE: 75 MMHG | OXYGEN SATURATION: 97 % | BODY MASS INDEX: 30.41 KG/M2 | SYSTOLIC BLOOD PRESSURE: 117 MMHG | HEIGHT: 70 IN | HEART RATE: 85 BPM | WEIGHT: 212.4 LBS | TEMPERATURE: 98.2 F

## 2023-11-09 DIAGNOSIS — C20 RECTAL CANCER: Primary | ICD-10-CM

## 2023-12-01 ENCOUNTER — TELEPHONE (OUTPATIENT)
Dept: ONCOLOGY | Facility: CLINIC | Age: 63
End: 2023-12-01

## 2023-12-01 NOTE — TELEPHONE ENCOUNTER
"Caller: Kit Harris \"BILL\"    Relationship to patient: Self    Best call back number: [phone]    Patient is needing: TO HAVE DR. MAHER FILL OUT CONTINUITY OF CARE FORM AND TO CALL TO VERIFY PT STATUS -[phone].  "

## 2023-12-05 ENCOUNTER — TELEPHONE (OUTPATIENT)
Dept: ONCOLOGY | Facility: CLINIC | Age: 63
End: 2023-12-05

## 2023-12-05 NOTE — TELEPHONE ENCOUNTER
"  Caller: Kit Harris \"BILL\"    Relationship: Self    Best call back number: [phone]    What is the best time to reach you: ANYTIME    Who are you requesting to speak with (clinical staff, provider,  specific staff member): CLINICAL    What was the call regarding: PT REQUESTING A CALL BACK TO GO OVER LAB RESULTS FROM ABOUT 3 WEEKS AGO WAS DRAWN AT [facility] [place]    Is it okay if the provider responds through MyChart: N/A            "

## 2023-12-05 NOTE — TELEPHONE ENCOUNTER
Spoke w/ pt and let him know that he would need to get the form or have them fax it to us.  Fax number given.  Pt v/u.

## 2023-12-06 NOTE — TELEPHONE ENCOUNTER
Called and spoke w/ pt's wife and let her know that Dr. Nguyễn reviewed his labs and everything is stable.  She v/u.

## 2024-02-20 DIAGNOSIS — C20 RECTAL CANCER: Primary | ICD-10-CM

## 2024-03-20 ENCOUNTER — TELEPHONE (OUTPATIENT)
Dept: ONCOLOGY | Facility: CLINIC | Age: 64
End: 2024-03-20

## 2024-03-20 NOTE — TELEPHONE ENCOUNTER
Spoke w/ pt's spouse and she states that he needs to have scans done at [facility] due to insurance.  Orders faxed to [phone] and confirmation received.

## 2024-03-20 NOTE — TELEPHONE ENCOUNTER
"  Caller: Kit Harris \"BILL\"    Relationship: Self    Best call back number: [phone]      What was the call regarding: PT IS NEEDING HIS PET SCAN AND CT SCAN SCHEDULED AT [facility] AND THEY WOULD NEED THE ORDERS     PHONE # [phone]    FAX ORDERS TO THEM      PLEASE CALL PATIENT TO ADVISE WHEN SENT   "

## 2024-04-16 ENCOUNTER — TELEPHONE (OUTPATIENT)
Dept: ONCOLOGY | Facility: CLINIC | Age: 64
End: 2024-04-16

## 2024-04-16 NOTE — TELEPHONE ENCOUNTER
"  Caller: Kit Harris \"JULIO CESAR\"    Relationship: Self  Best call back number: [phone]    What is the best time to reach you: ANY    Who are you requesting to speak with (clinical staff, provider,  specific staff member): CLINICAL     What was the call regarding: JULIO CESAR IS CALLING STATES HE HAD HIS CTS SCANS DONE 4-4 AT [facility]   HE HAS NEVER RECEIVED HIS RESULTS    PLEASE CALL AND ADVISE       "

## 2024-04-17 NOTE — TELEPHONE ENCOUNTER
Called [facility] to get results.  They requested that I fax a cover sheet to them.  Cover sheet faxed.  She states that I will receive results today.

## 2024-04-18 ENCOUNTER — CLINICAL SUPPORT (OUTPATIENT)
Dept: FAMILY MEDICINE CLINIC | Facility: CLINIC | Age: 64
End: 2024-04-18
Payer: MEDICARE

## 2024-04-18 DIAGNOSIS — D50.8 OTHER IRON DEFICIENCY ANEMIA: Primary | ICD-10-CM

## 2024-04-18 DIAGNOSIS — C20 RECTAL CANCER: ICD-10-CM

## 2024-04-18 LAB
ALBUMIN SERPL-MCNC: 3.9 G/DL (ref 3.5–5.2)
ALBUMIN/GLOB SERPL: 1.6 G/DL
ALP SERPL-CCNC: 87 U/L (ref 39–117)
ALT SERPL W P-5'-P-CCNC: 21 U/L (ref 1–41)
ANION GAP SERPL CALCULATED.3IONS-SCNC: 15 MMOL/L (ref 5–15)
AST SERPL-CCNC: 21 U/L (ref 1–40)
BASOPHILS # BLD AUTO: 0.07 10*3/MM3 (ref 0–0.2)
BASOPHILS NFR BLD AUTO: 0.9 % (ref 0–1.5)
BILIRUB SERPL-MCNC: 0.2 MG/DL (ref 0–1.2)
BUN SERPL-MCNC: 8 MG/DL (ref 8–23)
BUN/CREAT SERPL: 7.7 (ref 7–25)
CALCIUM SPEC-SCNC: 9.5 MG/DL (ref 8.6–10.5)
CHLORIDE SERPL-SCNC: 104 MMOL/L (ref 98–107)
CO2 SERPL-SCNC: 22 MMOL/L (ref 22–29)
CREAT SERPL-MCNC: 1.04 MG/DL (ref 0.76–1.27)
DEPRECATED RDW RBC AUTO: 55 FL (ref 37–54)
EGFRCR SERPLBLD CKD-EPI 2021: 80.7 ML/MIN/1.73
EOSINOPHIL # BLD AUTO: 0.41 10*3/MM3 (ref 0–0.4)
EOSINOPHIL NFR BLD AUTO: 5.5 % (ref 0.3–6.2)
ERYTHROCYTE [DISTWIDTH] IN BLOOD BY AUTOMATED COUNT: 16.2 % (ref 12.3–15.4)
GLOBULIN UR ELPH-MCNC: 2.5 GM/DL
GLUCOSE SERPL-MCNC: 116 MG/DL (ref 65–99)
HCT VFR BLD AUTO: 30.2 % (ref 37.5–51)
HGB BLD-MCNC: 8.7 G/DL (ref 13–17.7)
IMM GRANULOCYTES # BLD AUTO: 0.02 10*3/MM3 (ref 0–0.05)
IMM GRANULOCYTES NFR BLD AUTO: 0.3 % (ref 0–0.5)
LYMPHOCYTES # BLD AUTO: 1.42 10*3/MM3 (ref 0.7–3.1)
LYMPHOCYTES NFR BLD AUTO: 19 % (ref 19.6–45.3)
MCH RBC QN AUTO: 26.8 PG (ref 26.6–33)
MCHC RBC AUTO-ENTMCNC: 28.8 G/DL (ref 31.5–35.7)
MCV RBC AUTO: 92.9 FL (ref 79–97)
MONOCYTES # BLD AUTO: 0.56 10*3/MM3 (ref 0.1–0.9)
MONOCYTES NFR BLD AUTO: 7.5 % (ref 5–12)
NEUTROPHILS NFR BLD AUTO: 5.01 10*3/MM3 (ref 1.7–7)
NEUTROPHILS NFR BLD AUTO: 66.8 % (ref 42.7–76)
NRBC BLD AUTO-RTO: 0 /100 WBC (ref 0–0.2)
PLATELET # BLD AUTO: 296 10*3/MM3 (ref 140–450)
PMV BLD AUTO: 9.5 FL (ref 6–12)
POTASSIUM SERPL-SCNC: 3.8 MMOL/L (ref 3.5–5.2)
PROT SERPL-MCNC: 6.4 G/DL (ref 6–8.5)
RBC # BLD AUTO: 3.25 10*6/MM3 (ref 4.14–5.8)
SODIUM SERPL-SCNC: 141 MMOL/L (ref 136–145)
WBC NRBC COR # BLD AUTO: 7.49 10*3/MM3 (ref 3.4–10.8)

## 2024-04-18 PROCEDURE — 85025 COMPLETE CBC W/AUTO DIFF WBC: CPT | Performed by: INTERNAL MEDICINE

## 2024-04-18 PROCEDURE — 82378 CARCINOEMBRYONIC ANTIGEN: CPT | Performed by: INTERNAL MEDICINE

## 2024-04-18 PROCEDURE — 36415 COLL VENOUS BLD VENIPUNCTURE: CPT | Performed by: INTERNAL MEDICINE

## 2024-04-18 PROCEDURE — 80053 COMPREHEN METABOLIC PANEL: CPT | Performed by: INTERNAL MEDICINE

## 2024-04-18 NOTE — TELEPHONE ENCOUNTER
Attempted to call pt back and let him know that Dr. Nguyễn reviewed his scan and it is okay, but he will discuss at his follow up next week.  Voicemail left for pt to return my call.  Left my direct number for pt to call back.

## 2024-04-19 LAB — CEA SERPL-MCNC: 3.76 NG/ML

## 2024-04-25 ENCOUNTER — OFFICE VISIT (OUTPATIENT)
Dept: ONCOLOGY | Facility: CLINIC | Age: 64
End: 2024-04-25
Payer: MEDICARE

## 2024-04-25 VITALS
OXYGEN SATURATION: 97 % | HEART RATE: 107 BPM | SYSTOLIC BLOOD PRESSURE: 108 MMHG | TEMPERATURE: 97.8 F | DIASTOLIC BLOOD PRESSURE: 68 MMHG | RESPIRATION RATE: 18 BRPM | HEIGHT: 70 IN | WEIGHT: 218 LBS | BODY MASS INDEX: 31.21 KG/M2

## 2024-04-25 DIAGNOSIS — C20 RECTAL CANCER: Primary | ICD-10-CM

## 2024-04-25 RX ORDER — TIOTROPIUM BROMIDE 18 UG/1
1 CAPSULE ORAL; RESPIRATORY (INHALATION)
COMMUNITY

## 2024-04-25 RX ORDER — BUDESONIDE AND FORMOTEROL FUMARATE DIHYDRATE 80; 4.5 UG/1; UG/1
2 AEROSOL RESPIRATORY (INHALATION)
COMMUNITY

## 2024-04-25 RX ORDER — AZITHROMYCIN 250 MG/1
250 TABLET, FILM COATED ORAL DAILY
COMMUNITY

## 2024-04-25 NOTE — PROGRESS NOTES
HEMATOLOGY ONCOLOGY OUTPATIENT FOLLOW UP      Patient name: Kit Harris  : 1960  MRN: [id number]  Primary Care Physician: Eliu Richards  Referring Physician: Eliu Richards  Reason For Consult:     Chief Complaint   Patient presents with    Follow-up     Rectal cancer           HPI:   History of Present Illness:  Kit Harris is 64 y.o. male who presented to our office on 21 for consultation regarding  Rectal adenocarcinoma status post multimodality treatment.    Patient had a heart attack in 2018. Patient had a stent placed and was started on dual antiplatelt therapy with aspirin and Brillinta. Patient has had minimal blood in stools prior to this. Patient was known to have fissure in the past and he always thought that was the cause of his blood in stools. When the patient started on anticoagulant, patient had worsening of his bleeding. He was then referred to see Dr. Bateman and had an EGD/colonsocopy    2019 - EGD/colon- moderate inactive gastritis. Reflux changes. Adenocarcinoma of the rectum. Large polyp right colon.  Biopsy confirmed adenocarcinoma of the rectum. Polypectomy revealed tubular adenoma.  2019 EUS revealed a large partially obstructing mass in the rectum. With clear invasion of muscularis propria. At least one 4 x 6 mm lymph node was appreciated. Presumptive diagnosis of T3 N1 rectal cancer.  MRI confirms diagnosis , PET with possible liver lesion , negative on MRI  Started on neoadjuvant treatment with XELODA and radiation    19 - C1D1 XelodaRT  10/3/19 - completed radiation treatment.  10/30/19 - Repeat MRI with good response based on imaging, no focal rectal mass lesion seen, decreased size of lymph node  19- Low anterior resection by Dr. Rich in [place]. This was complicated by Abscess, WILL post surgery.   Final path with ypT2N0 staging 0/20 LN positive  20 - C1D1 XELOX  20 C2D1  20 - C3D1  With  intolerance, treatment changed to single agent xeloda and eventually stopped   7/2020 - CT imaging with mildly increased adenopathy in the abdomen  11/2020 - CT imaging with improvement in presacral area, no evidence of disease.  5/2021 - CT CAP interval development of wedge compression fracture. No lytic lesions. No evidence of metastatic disease.  6/2021 -patient was admitted to [facility] for reversal of ostomy.  He had complications after the procedure with anastomotic leak requiring emergency surgery and repeat ileostomy.  He had aspiration pneumonia was on the ventilator and eventually recovered.  He is discharged has a wound VAC in place.  July 11, 2021 -CT chest abdomen pelvis with contrast showing no evidence of recurrence of disease.  August 2021 -CEA 2.6.  November 2021 -CT chest abdomen pelvis with stable postoperative changes, stable compression fractures, stable fracture of the sacrum.  No evidence of new or recurrent disease.  December 2021 -CEA 1.9.  June 2022 - CT imaging with continued fistula, no evidence of recurrent malignancy.  December 2022 -patient admitted to [facility] with NSTEMI needed PCI started on dual antiplatelet therapy  12/16/2022 CEA slightly increased to 3.2  1/3/2023 - PET CT with some uptake in the anorectal junction  1/31/2023 - sigmoidoscopy with biopsy showing single erosion in the distal rectum  3/23/2023 - CEA increased to 3.46  5/2023 - hb 11.7, cea 3.50  4/18/24 - CEA 3.76    Subjective:  Patient denies any new symptoms.       Past Medical History:   Diagnosis Date    Anemia     Back pain     Constipation     COPD (chronic obstructive pulmonary disease)     Extremity pain     Left leg    History of heart attack     HTN (hypertension)     Hyperlipidemia     Ileostomy, has currently     Low back pain     Low serum cortisol level     ROCIO (obstructive sleep apnea)     Osteoporosis     Rectal cancer     Colorectal surgery       Past Surgical History:   Procedure  Laterality Date    BACK SURGERY      CHOLECYSTECTOMY      CORONARY ANGIOPLASTY      HERNIA REPAIR      ILEOSTOMY      RECTAL SURGERY           Current Outpatient Medications:     Acetaminophen 325 MG capsule, , Disp: , Rfl:     albuterol sulfate  (90 Base) MCG/ACT inhaler, Inhale 2 puffs 4 (Four) Times a Day As Needed., Disp: , Rfl:     aspirin 81 MG EC tablet, Take 1 tablet by mouth., Disp: , Rfl:     atorvastatin (LIPITOR) 80 MG tablet, Take 1 tablet by mouth Daily., Disp: , Rfl:     azithromycin (ZITHROMAX) 250 MG tablet, Take 1 tablet by mouth Daily. Monday, Wednesday, and Friday dosing, Disp: , Rfl:     budesonide-formoterol (SYMBICORT) 80-4.5 MCG/ACT inhaler, Inhale 2 puffs 2 (Two) Times a Day., Disp: , Rfl:     buPROPion SR (WELLBUTRIN SR) 100 MG 12 hr tablet, Take 1 tablet by mouth 2 (Two) Times a Day., Disp: , Rfl:     busPIRone (BUSPAR) 5 MG tablet, Take 1 tablet by mouth 3 (Three) Times a Day., Disp: , Rfl:     Calcium Carb-Cholecalciferol (Calcium 1000 + D) 1000-800 MG-UNIT tablet, Take 1,200 mg by mouth Daily., Disp: , Rfl:     calcium carbonate-vitamin d 600-400 MG-UNIT per tablet, Take  by mouth., Disp: , Rfl:     chlorproMAZINE (THORAZINE) 25 MG tablet, Take 1 tablet by mouth 3 (Three) Times a Day., Disp: , Rfl:     clopidogrel (PLAVIX) 75 MG tablet, Take 1 tablet by mouth Daily., Disp: , Rfl:     diclofenac (VOLTAREN) 50 MG EC tablet, Take 1 tablet by mouth 2 (Two) Times a Day., Disp: , Rfl:     DULoxetine (CYMBALTA) 60 MG capsule, , Disp: , Rfl:     Entresto 24-26 MG tablet, , Disp: , Rfl:     famotidine (PEPCID) 40 MG tablet, Take 1 tablet by mouth every night at bedtime., Disp: , Rfl:     Fluticasone-Umeclidin-Vilant (TRELEGY) 200-62.5-25 MCG/INH inhaler, Inhale 1 puff., Disp: , Rfl:     furosemide (LASIX) 40 MG tablet, Take 1 tablet by mouth 2 (Two) Times a Day., Disp: , Rfl:     gabapentin (NEURONTIN) 600 MG tablet, , Disp: , Rfl:     hydrOXYzine (ATARAX) 25 MG tablet, TAKE 1 TABLET BY  "MOUTH 3 TIMES DAILY AS NEEDED FOR ITCHING., Disp: , Rfl:     ipratropium (ATROVENT HFA) 17 MCG/ACT inhaler, Inhale 2 puffs 4 (Four) Times a Day., Disp: , Rfl:     levothyroxine (SYNTHROID, LEVOTHROID) 50 MCG tablet, , Disp: , Rfl:     metoprolol succinate XL (TOPROL-XL) 25 MG 24 hr tablet, TAKE 1 TABLET BY MOUTH 1 TIME EACH DAY. DO NOT CRUSH OR CHEW., Disp: , Rfl:     nitroglycerin (NITROSTAT) 0.4 MG SL tablet, PLACE 1 TABLET UNDER THE TONGUE EVERY 5 MINUTES AS NEEDED FOR CHEST PAIN., Disp: , Rfl:     oxyCODONE (ROXICODONE) 10 MG tablet, 1.5 tablets., Disp: , Rfl:     tiotropium (SPIRIVA) 18 MCG per inhalation capsule, Place 1 capsule into inhaler and inhale Daily., Disp: , Rfl:     Allergies   Allergen Reactions    Adhesive Tape Unknown - Low Severity     Other reaction(s): Multiple skin tears    Tape Irritability       Family History   Problem Relation Age of Onset    Leukemia Mother     Heart disease Father     Lung disease Father     Vaginal cancer Sister     Diabetes Brother     Hypertension Brother        Cancer-related family history includes Vaginal cancer in his sister.    Social History     Tobacco Use    Smoking status: Former    Smokeless tobacco: Never    Tobacco comments:     Quit 06/2021   Vaping Use    Vaping status: Every Day    Substances: Nicotine   Substance Use Topics    Alcohol use: Not Currently    Drug use: Never     Social History     Social History Narrative    Not on file      Objective:    Vitals:    04/25/24 1124   BP: 108/68   Pulse: 107   Resp: 18   Temp: 97.8 °F (36.6 °C)   SpO2: 97%   Weight: 98.9 kg (218 lb)   Height: 177.8 cm (70\")   PainSc:   6   PainLoc: Leg         Body mass index is 31.28 kg/m².  ECOG  (1) Restricted in physically strenuous activity, ambulatory and able to do work of light nature    Physical Exam:     Physical Exam  Constitutional:       Appearance: Normal appearance.   HENT:      Head: Normocephalic and atraumatic.      Mouth/Throat:      Mouth: Mucous " membranes are moist.   Eyes:      Extraocular Movements: Extraocular movements intact.      Pupils: Pupils are equal, round, and reactive to light.   Cardiovascular:      Rate and Rhythm: Normal rate and regular rhythm.      Pulses: Normal pulses.      Heart sounds: No murmur heard.  Pulmonary:      Effort: Pulmonary effort is normal.      Breath sounds: Normal breath sounds.   Abdominal:      General: There is no distension.      Palpations: Abdomen is soft. There is no mass.      Tenderness: There is no abdominal tenderness.   Musculoskeletal:         General: Normal range of motion.      Cervical back: Normal range of motion and neck supple.   Skin:     General: Skin is warm.   Neurological:      General: No focal deficit present.      Mental Status: He is alert.   Psychiatric:         Mood and Affect: Mood normal.       Lab Results - Last 18 Months   Lab Units 04/18/24  1153 02/29/24  0255 02/28/24  0447   WBC 10*3/mm3 7.49 13.46* 12.42*   HEMOGLOBIN g/dL 8.7* 10.5* 10.1*   HEMATOCRIT % 30.2* 33.2* 32.4*   PLATELETS 10*3/mm3 296 265 252   MCV fL 92.9 89.7 90.0     Lab Results - Last 18 Months   Lab Units 04/18/24  1153 05/04/23  1027 03/23/23  1007   SODIUM mmol/L 141 141 144   POTASSIUM mmol/L 3.8 4.2 5.2   CHLORIDE mmol/L 104 103 101   CO2 mmol/L 22.0 24.9 31.0*   BUN mg/dL 8 9 9   CREATININE mg/dL 1.04 0.96 0.96   CALCIUM mg/dL 9.5 9.5 9.8   BILIRUBIN mg/dL 0.2 0.3 0.3   ALK PHOS U/L 87 103 141*   ALT (SGPT) U/L 21 9 15   AST (SGOT) U/L 21 13 14   GLUCOSE mg/dL 116* 60* 101*       Lab Results   Component Value Date    GLUCOSE 116 (H) 04/18/2024    BUN 8 04/18/2024    CREATININE 1.04 04/18/2024    EGFRIFNONA 73 12/16/2021    EGFRIFAFRI >60 11/07/2022    BCR 7.7 04/18/2024    K 3.8 04/18/2024    CO2 22.0 04/18/2024    CALCIUM 9.5 04/18/2024    ALBUMIN 3.9 04/18/2024    LABIL2 0.9 (L) 11/01/2022    AST 21 04/18/2024    ALT 21 04/18/2024       Lab Results - Last 18 Months   Lab Units 11/04/22  0358 11/03/22  0400  "11/02/22  0400 11/01/22  1917 10/30/22  2102 10/30/22  1201   INR INR  --   --   --  1.2  --  1.1   APTT s 29.0 28.9 33.4 34.4   < > 34.4    < > = values in this interval not displayed.       Lab Results   Component Value Date    IRON 63 06/16/2022    TIBC 370 06/16/2022    FERRITIN 114.30 06/16/2022       No results found for: \"FOLATE\"    No results found for: \"OCCULTBLD\"    No results found for: \"RETICCTPCT\"    No results found for: \"CITORDCH91\"  No results found for: \"SPEP\", \"UPEP\"  Uric Acid   Date Value Ref Range Status   11/25/2019 7.1 2.5 - 8.5 mg/dL Final     No results found for: \"TAN\", \"RF\", \"SEDRATE\"  Lab Results   Component Value Date    FIBRINOGEN 576 (H) 07/03/2021     Lab Results   Component Value Date    PTT 29.0 11/04/2022    INR 1.2 11/01/2022     No results found for: \"\"  Lab Results   Component Value Date    CEA 3.76 04/18/2024     No components found for: \"CA-19-9\"  No results found for: \"PSA\"  No results found for: \"AFPTM\", \"WW2585\", \"HCGTM\", \"SPEP\", \"LARISSA\"         Assessment & Plan     Patient is a 61-year-old male with T3 N1 M0 rectal adenocarcinoma. He has completed treatment with neoadjuvant chemoradiation with Xeloda s/p LAR and finished adjuvant treatment with XELOX    Rectal adenocarcinoma  T3N1 disease preoperatively  downstaged to ypT2N0 post surgery with no lymph nodes positive  I discussed adjuvant chemotherapy for a total of 4 months with XELOX based on NCCN guidelines, significant benefit even with downstaged after neoadjuvant chemoradiation. Benefit is not very significant in patients with CR however patient had a HI and would still have benefit.   Patient was agreeable and was started on XELOX   C2 with worsening neuropathty affecting quality of life, hand foot disease with scaling, erythema of the hands  Dose reduction of Oxaliplatin to 75 mg/m2 and Xeloda to 1500 mg BID  C3 with worsening neuropathy, Oxaliplatin was stopped and continued single agent Xeloda. Patient has had " significant toxicity with xeloda, most concerning is neurotoxicity with confusion which has improved significantly after stopping Xeloda.  With increasing toxicity, treatment was witheld. DPD enzyme assay was normal.  Patient was seen by his surgeon for ileostomy reversal.contineus to have a presacral collection and had a drain tube placed with recent imaging showing improvement in the area.  Ileostomy reversal complicated with postop anastomotic leak, aspiration pneumonia.  He has a new ileostomy.  Continue see wound care wound has been improving.  July 2021 imaging with no evidence of disease recent CEA 2.6 and normal.  November imaging with no evidence of disease recurrence.  CEA 1.9.    June CT imaging with no recurrence. cea stable at 2.82  January 2023 PET CT with increased uptake in the anorectal area. Subsequent sigmoidoscopy and biopsy no concerning findings except erosion.  CEA is slowly increasing however scans have been clear. Recent CT at [facility] with no recurrence. Will plan on getting signatera given slowly increasing CEA.  Will order labs and follow up in 6 months     Rectal bleeding  With the fistula sigmoidoscopy showed erosion likely the cause of bleeding.  This is still intermittent.    Central Hypothyroidism  Patient is on low dose prednisone 5 mg. No change    Leg weakness/Balance problems  Neuropathy is likely contributing to the same  gabapentin 600 mg QID  that has improved now. Continue the same    Lower back pain  patient has seen a pain clinic, continues to be on oxycodone 10 mg as needed, also has had steroid injection , pain control better. Has had compression fracture,  bisphosphonates with Endocrine continues to be on calcium vitamin D.   Follows with pain clinic locally    COPD   echocardiogram with normal findings  likely from COPD  ICS stopped, continues to be on Stiolto   Follows with [facility]    Anemia  S/p 2 doses of iv venofer in summer 2021  Now he has intermittent intermittent  rectal bleeding and low hemoglobin  Hemoglobin has been stable

## 2024-09-25 ENCOUNTER — TELEPHONE (OUTPATIENT)
Dept: ONCOLOGY | Facility: CLINIC | Age: 64
End: 2024-09-25

## 2024-09-25 NOTE — TELEPHONE ENCOUNTER
"    “Please be informed that patient has passed. Patient has been marked  in the system. The date of death is: 24\".    Caller: MASSIEL BOLTON    Relationship: Emergency Contact    Best call back number: [phone]    Did the patient have surgery within 30 days of their passing (Y/N): N    "